# Patient Record
Sex: FEMALE | Race: WHITE | NOT HISPANIC OR LATINO | Employment: STUDENT | URBAN - METROPOLITAN AREA
[De-identification: names, ages, dates, MRNs, and addresses within clinical notes are randomized per-mention and may not be internally consistent; named-entity substitution may affect disease eponyms.]

---
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
## 2024-07-18 ENCOUNTER — TELEPHONE (OUTPATIENT)
Age: 19
End: 2024-07-18

## 2024-07-18 NOTE — TELEPHONE ENCOUNTER
"Behavioral Health Outpatient Intake Questions    Referred By   : Self    Please advise interviewee that they need to answer all questions truthfully to allow for best care, and any misrepresentations of information may affect their ability to be seen at this clinic   => Was this discussed? Yes     If Minor Child (under age 18)    Who is/are the legal guardian(s) of the child?     Is there a custody agreement? No     If \"YES\"- Custody orders must be obtained prior to scheduling the first appointment  In addition, Consent to Treatment must be signed by all legal guardians prior to scheduling the first appointment    If \"NO\"- Consent to Treatment must be signed by all legal guardians prior to scheduling the first appointment    Behavioral Health Outpatient Intake History -     Presenting Problem (in patient's own words): Anxiety, working situations through, struggles with relationships, adhd    Are there any communication barriers for this patient?     No                                               If yes, please describe barriers:     If there is a unique situation, please refer to Gallo Antoine/Tammie Mccarty for final determination.    Are you taking any psychiatric medications? Yes     If \"YES\" -What are they Zoloft, Concerta    If \"YES\" -Who prescribes? PCP    Has the Patient previously received outpatient Talk Therapy or Medication Management from [facility]  No        If \"YES\"- When, Where and with Whom?         If \"NO\" -Has Patient received these services elsewhere?       If \"YES\" -When, Where, and with Whom?    Has the Patient abused alcohol or other substances in the last 6 months ? No  No concerns of substance abuse are reported.     If \"YES\" -What substance, How much, How often?     If illegal substance: Refer to [facility] (for EB) or [facility].   If Alcohol in excess of 10 drinks per week:  Refer to [facility] (for EB) or [facility]    Legal History-     Is this treatment court " "ordered? No   If \"yes \"send to :  Talk Therapy : Send to Gallo Antoine/Tammie Mccarty for final determination   Med Management: Send to Dr Walker for final determination     Has the Patient been convicted of a felony?  No   If \"Yes\" send to -When, What?  Talk Therapy: Send to Gallo Arash/Tammie Mccarty for final determination   Med Management: Send to Dr Walker for final determination     ACCEPTED as a patient Yes  If \"Yes\" Appointment Date: NP 10/1 at 1 pm with Miri TURNER NP appt 10/22 at 12:30 with Danica Graham    Referred Elsewhere? No  If “Yes” - (Where? Ex: [facility], [facility], [facility], [facility], etc.)       Name of Insurance Co:[facility] [place] [facility] verified: [id number]   Insurance ID#[id number]   Insurance Phone #  If ins is primary or secondary?Primary  If patient is a minor, parents information such as Name, D.O.B of guarantor.  "

## 2024-07-18 NOTE — TELEPHONE ENCOUNTER
Contacted pt. off of Wait List in order to Schedule TT/MM, LVM to contact [phone] option 3 (Intake Team) in order to be scheduled with a Provider.    Promise verified:  [facility]   [phone]

## 2024-07-25 ENCOUNTER — TELEPHONE (OUTPATIENT)
Dept: PSYCHIATRY | Facility: CLINIC | Age: 19
End: 2024-07-25

## 2024-07-25 NOTE — TELEPHONE ENCOUNTER
One week follow up call for New Patient appointment with Miri Vernon on 10/01/2024 was made on 07/25/2024. Writer informed patient of New Patient paperwork needing to be completed 5 days prior to the appointment. Writer confirmed paperwork has been sent via Mail.    Appointment was made on: 07/18/2024

## 2024-08-19 ENCOUNTER — TELEPHONE (OUTPATIENT)
Dept: PSYCHIATRY | Facility: CLINIC | Age: 19
End: 2024-08-19

## 2024-08-19 NOTE — TELEPHONE ENCOUNTER
Writer called and spoke with mother about office need to c/x new patient therapy appt with ROBERTO Vernon.  Writer offered therapy appt with resident provider.  Patient and mother stated that with need to look at her school schedule to see which time would work for her the best.  Mother stated that she will call back when patient can get to her schedule. Transfer call to resident MR to make appt .

## 2024-08-19 NOTE — TELEPHONE ENCOUNTER
Patient mother called back and stated that patient could do Monday.  When making this appt mother stated that patient would like a female therapist.  Writer does not have any free female resident free at this time for therapy.

## 2024-08-20 NOTE — TELEPHONE ENCOUNTER
Called and left message for patient to return a call to [phone] regarding rescheduling their NP appt.

## 2024-08-26 ENCOUNTER — TELEPHONE (OUTPATIENT)
Dept: PSYCHIATRY | Facility: CLINIC | Age: 19
End: 2024-08-26

## 2024-08-26 NOTE — TELEPHONE ENCOUNTER
Called and left message for patient to inform 10/22/24 was cancelled due to provider being out of the office. Requested return call to reschedule. Please schedule upon return call. Thank you.

## 2024-09-03 ENCOUNTER — TELEPHONE (OUTPATIENT)
Dept: PSYCHIATRY | Facility: CLINIC | Age: 19
End: 2024-09-03

## 2024-09-03 NOTE — TELEPHONE ENCOUNTER
One week follow up call for New Patient appointment with   Danica Graham PA-C   on 10/09/2024 was made on 09/03/2024. Writer informed patient of New Patient paperwork needing to be completed 5 days prior to the appointment. Writer confirmed paperwork has been sent via Mail.    Appointment was made on: 08/26/2024

## 2024-10-01 ENCOUNTER — TELEPHONE (OUTPATIENT)
Dept: PSYCHIATRY | Facility: CLINIC | Age: 19
End: 2024-10-01

## 2024-10-01 NOTE — TELEPHONE ENCOUNTER
Called and left message for parent/guardian to inform 10/9/2024 was cancelled due to provider schedule change. Requested return call to reschedule. Please transfer to office upon return call to reschedule.     Office - Danica has approved for this patient to be scheduled in a time slot end of her day in order for patient to be seen sooner, like a 4-4:30pm. Just so it's the last appointment of the day.

## 2024-10-08 ENCOUNTER — TELEPHONE (OUTPATIENT)
Dept: PSYCHIATRY | Facility: CLINIC | Age: 19
End: 2024-10-08

## 2024-10-08 NOTE — TELEPHONE ENCOUNTER
One week follow up call for New Patient appointment with   Danica Graham PA-C   on 11/08/2024 was made on 10/08/2024. Writer informed patient of New Patient paperwork needing to be completed 5 days prior to the appointment. Writer confirmed paperwork has been sent via Mail.    Appointment was made on: 10/01/2024

## 2024-11-03 NOTE — PSYCH
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 (attention deficit hyperactivity disorder)     Anxiety     Depression      No past surgical history on file.  Allergies:    No Known Allergies  History Review:    The following portions of the patient's history were reviewed and updated as appropriate: allergies, current medications, past family history, past medical history, past social history, past surgical history, and problem list.    OBJECTIVE:      Mental Status Evaluation:    Appearance casually dressed, adequate grooming, good eye contact   Behavior pleasant, cooperative, calm, with anxious bearing, fidgeting   Speech normal volume, fluent, clear, coherent, with increased rate, sometimes hypertalkative and sometimes somewhat pressured   Mood depressed, anxious, with mention of sporadic irritability or euphoric moods with impulsivity   Affect normal range and intensity   Thought Processes organized, goal directed, somewhat concrete at times, can be obsessive, negative and self-deprecating, mistrustful of others-- but seems at least in part to stem from her impaired self-esteem/insecurities   Associations Somewhat concrete at times, otherwise intact   Thought Content no overt delusions   Perceptual " "Disturbances: Question of possible AH, but no definite hallucinations or paranoia and Pt does not appear to be responding to internal stimuli   Abnormal Thoughts  Risk Potential Suicidal ideation - None  Homicidal ideation - None  Potential for aggression - No   Orientation oriented to person, place, situation, day of week, date, month of year, and year   Memory short term memory grossly intact   Consciousness alert and awake   Attention Span attention span and concentration are age appropriate   Intellect appears to be of average intelligence   Insight partial   Judgement fair   Muscle Strength and  Gait normal gait and normal balance   Language no difficulty naming common objects, no difficulty repeating a phrase   Fund of Knowledge adequate knowledge of current events  adequate fund of knowledge regarding past history  adequate fund of knowledge regarding vocabulary    Pain none   Pain Scale N/A       Laboratory Results: I have personally reviewed all pertinent laboratory/tests results.  Most Recent Labs:   Lab Results   Component Value Date    SODIUM 137 05/21/2024    K 4.5 05/21/2024     05/21/2024    CO2 28 05/21/2024    BUN 9 05/21/2024    CREATININE 0.63 05/21/2024    GLUC 80 05/21/2024    CALCIUM 8.9 05/21/2024    AST 9 04/20/2022    ALT 18 04/20/2022    ALKPHOS 121 (H) 04/20/2022    TP 7.8 04/20/2022    ALB 3.2 (L) 04/20/2022    TBILI 0.2 04/20/2022    FREET4 1.27 09/22/2020     RPR: No results found for: \"RPR\"  Pregnancy: No results found for: \"PREGUR\", \"PREGSERUM\", \"HCG\", \"HCGQUANT\"  Drug Screen: No results found for: \"AMPMETHUR\", \"BARBTUR\", \"BDZUR\", \"THCUR\", \"COCAINEUR\", \"METHADONEUR\", \"OPIATEUR\", \"PCPUR\", \"ECSTASYUR\"  Medical alcohol level No results found for: \"ETOH\"  EKG No results found for: \"VENTRATE\", \"ATRIALRATE\", \"PRINT\", \"QRSDINT\", \"QTINT\", \"QTCINT\", \"PAXIS\", \"QRSAXIS\", \"TWAVEAXIS\"  Imaging Studies: No results found.      Assessment/Plan:     Diagnoses and all orders for this " visit:    Generalized anxiety disorder  -     CBC and differential; Future  -     Hepatic function panel; Future  -     Pregnancy Test (HCG Qualitative); Future  -     ECG 12 lead; Future    Social anxiety disorder  -     CBC and differential; Future  -     Hepatic function panel; Future  -     Pregnancy Test (HCG Qualitative); Future  -     ECG 12 lead; Future    Mood disorder (HCC)  -     CBC and differential; Future  -     Hepatic function panel; Future  -     Pregnancy Test (HCG Qualitative); Future  -     lamoTRIgine (LaMICtal) 25 mg tablet; Take 1 tablet (25 mg total) by mouth daily at bedtime for 14 days Start with this dose first  -     lamoTRIgine (LaMICtal) 100 mg tablet; Take 0.5 tablets (50 mg total) by mouth daily at bedtime Start this after the 25mg tabs are finished (on 11/23/2024)  -     ECG 12 lead; Future    Adult ADHD  -     methylphenidate (CONCERTA) 27 MG ER tablet; Take 1 tablet (27 mg total) by mouth daily Max Daily Amount: 27 mg  -     methylphenidate (CONCERTA) 27 MG ER tablet; Take 1 tablet (27 mg total) by mouth daily For ongoing therapy Max Daily Amount: 27 mg  -     ECG 12 lead; Future          Plan:  Pt is having depressive, impulsive, anxious (general and socially related) Sxs, though at the same time, verbalizes that she does not care about having interpersonal relationships. She has some ASD traits (with h/o borderline for ASD Dx per past testing), some OCD traits, some questionable AH but does not give h/o psychosis nor does she demonstrate such at this time.  She does have mistrust of others but this could stem from prior bullying and also ASD disconnect.  No h/o trauma or PTSD Sxs.  She gets panic attacks and also has h/o ADHD.  Working Dxs are as listed above.  She feels her panic and ADHD are under control with her current medicines.  Her PCP has been bridging the gap to this appt and had continued the Sertraline and Methylphenidate ER.  The SSRI was recently increased and was  helping for a while, but not so much anymore.  Tx options discussed and Pt accepts to start Lamotrigine for mood mgt and to continue her other medicines unchanged at this time.  Will tease out mood and personality factors as well as ASD factors a bit further.  I would recommend repeat neuropsychological testing to re-evaluate for ASD versus mood or personality factors. Treatment plan not done today due to extensive time spent gathering Hx and discussing the assessment and plan.  Pt accepts today's plan.   Start Lamotrigine 25mg (1) tab po qhs x 2 weeks # 14, then 100mg (1/2) tab po qhs # 90  Continue:   Sertraline 50mg + 25mg (1) tab po qd -- PCP gave a Rx for Qty 90 each 10/13/2024  Methylphenidate 27mg (1) tab # 28 + 28  last filled 10/10/2024 per PDMP  Pt to have her psychotherapy intake appt with Gracy Stearns LCSW on 12/27/2024  Get CBC with diff, LFTs, Serum Beta HCG, and EKG  Return Tues 12/10/2024 at 6:30PM, and Thurs 1/9/2025 at 4:30PM.  Can call any time sooner prn.  Pt made aware of the 24-7 on call service line.    Risks/Benefits/Precautions:      Risks, Benefits And Possible Side Effects Of Medications:    Risks, benefits, and possible side effects of medications explained to Juliana and she verbalizes understanding and agreement for treatment.  Risks of medications in pregnancy explained to Juliana. She verbalizes understanding and agrees to notify her doctor if she becomes pregnant.    Controlled Medication Discussion:     Juliana has been filling controlled prescriptions on time as prescribed according to [place] Prescription Drug Monitoring Program.   Discussed the SE and risk of addiction to stimulants.       Danica Graham PA-C    Visit Time    Visit Start Time: 4:25PM  Visit Stop Time: 7:35PM  Total Visit Duration:  As above minutes

## 2024-11-08 ENCOUNTER — OFFICE VISIT (OUTPATIENT)
Dept: PSYCHIATRY | Facility: CLINIC | Age: 19
End: 2024-11-08
Payer: COMMERCIAL

## 2024-11-08 VITALS — WEIGHT: 198 LBS | HEIGHT: 63 IN | BODY MASS INDEX: 35.08 KG/M2

## 2024-11-08 DIAGNOSIS — F41.1 GENERALIZED ANXIETY DISORDER: Primary | ICD-10-CM

## 2024-11-08 DIAGNOSIS — F39 MOOD DISORDER (HCC): ICD-10-CM

## 2024-11-08 DIAGNOSIS — F90.9 ADULT ADHD: ICD-10-CM

## 2024-11-08 DIAGNOSIS — F40.10 SOCIAL ANXIETY DISORDER: ICD-10-CM

## 2024-11-08 PROCEDURE — 90792 PSYCH DIAG EVAL W/MED SRVCS: CPT | Performed by: PHYSICIAN ASSISTANT

## 2024-11-08 RX ORDER — METHYLPHENIDATE HYDROCHLORIDE 27 MG/1
27 TABLET ORAL DAILY
Qty: 28 TABLET | Refills: 0 | Status: SHIPPED | OUTPATIENT
Start: 2024-11-08

## 2024-11-08 RX ORDER — LAMOTRIGINE 25 MG/1
25 TABLET ORAL
Qty: 14 TABLET | Refills: 0 | Status: SHIPPED | OUTPATIENT
Start: 2024-11-08 | End: 2024-11-22

## 2024-11-08 RX ORDER — LAMOTRIGINE 100 MG/1
50 TABLET ORAL
Qty: 90 TABLET | Refills: 0 | Status: SHIPPED | OUTPATIENT
Start: 2024-11-08

## 2024-11-09 NOTE — ASSESSMENT & PLAN NOTE
Orders:    CBC and differential; Future    Hepatic function panel; Future    Pregnancy Test (HCG Qualitative); Future    lamoTRIgine (LaMICtal) 25 mg tablet; Take 1 tablet (25 mg total) by mouth daily at bedtime for 14 days Start with this dose first    lamoTRIgine (LaMICtal) 100 mg tablet; Take 0.5 tablets (50 mg total) by mouth daily at bedtime Start this after the 25mg tabs are finished (on 11/23/2024)    ECG 12 lead; Future

## 2024-11-09 NOTE — ASSESSMENT & PLAN NOTE
Orders:    CBC and differential; Future    Hepatic function panel; Future    Pregnancy Test (HCG Qualitative); Future    ECG 12 lead; Future

## 2024-11-12 ENCOUNTER — TELEPHONE (OUTPATIENT)
Age: 19
End: 2024-11-12

## 2024-11-12 NOTE — TELEPHONE ENCOUNTER
Staice called in to verify Juliana's appointments with Danica Graham and Gracy Stearns. Writer verified dates and times with Stacie.

## 2024-11-27 ENCOUNTER — NURSE TRIAGE (OUTPATIENT)
Age: 19
End: 2024-11-27

## 2024-11-27 NOTE — TELEPHONE ENCOUNTER
"Per patient she has had 1 day of brown vaginal discharge. Patient states it smells like blood. Patient LMP was 1 month ago. Patient is on birth control pills but missed 3 days of pills due to recent illness. Patient states she has slight cramping. No fever, no vaginal symptoms. Patient states she is half way thru second week of pill pack currently. Advised to continue pills and that breakthru bleeding is from missed pills. Advised cramping is also most likely due to change in hormone level as well and she may have irregular bleeding until placebo week and then new pill pack starts. Patient advised to use back up method, patient states she is currently not active with a partner. Advised to call back if any other concerns.   Reason for Disposition   Taking birth control pills and has missed one or more pills; or took a progestin-only pill late    Answer Assessment - Initial Assessment Questions  1. BLEEDING SEVERITY: \"Describe the bleeding that you are having.\" \"How much bleeding is there?\"       Irregular brown spotting , smells like blood    2. ONSET: \"When did the bleeding begin?\" \"Is it continuing now?\"      Today   3. MENSTRUAL PERIOD: \"When was the last normal menstrual period?\" \"How is this different than your period?\"      Last month   4. REGULARITY: \"How regular are your periods?\"      On hormonal BCP   5. ABDOMEN PAIN: \"Do you have any pain?\" \"How bad is the pain?\"  (e.g., Scale 0-10; none, mild, moderate, or severe)      Mild cramping   6. PREGNANCY: \"Is there any chance you are pregnant?\" \"When was your last menstrual period?\"      Denies   7. BREASTFEEDING: \"Are you breastfeeding?\"      denies  8. HORMONE MEDICINES: \"Are you taking any hormone medicines, prescription or over-the-counter?\" (e.g., birth control pills, estrogen)      BCP   9. BLOOD THINNER MEDICINES: \"Do you take any blood thinners?\" (e.g., Coumadin / warfarin, Pradaxa / dabigatran, aspirin)      denies  10. CAUSE: \"What do you think is " "causing the bleeding?\" (e.g., recent gyn surgery, recent gyn procedure; known bleeding disorder, cervical cancer, polycystic ovarian disease, fibroids)          Several days of Missed pills   11. HEMODYNAMIC STATUS: \"Are you weak or feeling lightheaded?\" If Yes, ask: \"Can you stand and walk normally?\"         denies  12. OTHER SYMPTOMS: \"What other symptoms are you having with the bleeding?\" (e.g., passed tissue, vaginal discharge, fever, menstrual-type cramps)        denies    Protocols used: Vaginal Bleeding - Abnormal-Adult-[place]    "

## 2024-11-28 DIAGNOSIS — N94.6 DYSMENORRHEA: ICD-10-CM

## 2024-11-28 DIAGNOSIS — R79.89 ELEVATED TESTOSTERONE LEVEL IN FEMALE: ICD-10-CM

## 2024-11-29 RX ORDER — NORGESTIMATE AND ETHINYL ESTRADIOL 7DAYSX3 28
1 KIT ORAL DAILY
Qty: 84 TABLET | Refills: 0 | Status: SHIPPED | OUTPATIENT
Start: 2024-11-29

## 2024-11-30 ENCOUNTER — APPOINTMENT (OUTPATIENT)
Dept: RADIOLOGY | Facility: MEDICAL CENTER | Age: 19
End: 2024-11-30
Payer: COMMERCIAL

## 2024-11-30 ENCOUNTER — OFFICE VISIT (OUTPATIENT)
Dept: URGENT CARE | Facility: MEDICAL CENTER | Age: 19
End: 2024-11-30
Payer: COMMERCIAL

## 2024-11-30 VITALS
RESPIRATION RATE: 22 BRPM | TEMPERATURE: 98.2 F | DIASTOLIC BLOOD PRESSURE: 62 MMHG | SYSTOLIC BLOOD PRESSURE: 120 MMHG | HEART RATE: 101 BPM | OXYGEN SATURATION: 95 %

## 2024-11-30 DIAGNOSIS — R06.02 SHORTNESS OF BREATH: ICD-10-CM

## 2024-11-30 DIAGNOSIS — Z75.8 DOES NOT HAVE PRIMARY CARE PROVIDER: ICD-10-CM

## 2024-11-30 DIAGNOSIS — J18.9 PNEUMONIA OF LEFT LOWER LOBE DUE TO INFECTIOUS ORGANISM: Primary | ICD-10-CM

## 2024-11-30 DIAGNOSIS — Z20.89 EXPOSURE TO PNEUMONIA: ICD-10-CM

## 2024-11-30 PROCEDURE — 99214 OFFICE O/P EST MOD 30 MIN: CPT

## 2024-11-30 PROCEDURE — 71046 X-RAY EXAM CHEST 2 VIEWS: CPT

## 2024-11-30 PROCEDURE — S9088 SERVICES PROVIDED IN URGENT: HCPCS

## 2024-11-30 RX ORDER — AMOXICILLIN 500 MG/1
1000 CAPSULE ORAL EVERY 8 HOURS SCHEDULED
Qty: 30 CAPSULE | Refills: 0 | Status: SHIPPED | OUTPATIENT
Start: 2024-11-30 | End: 2024-12-05

## 2024-11-30 RX ORDER — ALBUTEROL SULFATE 90 UG/1
2 INHALANT RESPIRATORY (INHALATION) EVERY 4 HOURS PRN
Qty: 8.5 G | Refills: 0 | Status: SHIPPED | OUTPATIENT
Start: 2024-11-30

## 2024-11-30 RX ORDER — HYDROCODONE BITARTRATE AND ACETAMINOPHEN 5; 325 MG/1; MG/1
1 TABLET ORAL EVERY 6 HOURS PRN
Refills: 0 | Status: CANCELLED | OUTPATIENT
Start: 2024-11-30

## 2024-11-30 RX ORDER — GUAIFENESIN 600 MG/1
1200 TABLET, EXTENDED RELEASE ORAL EVERY 12 HOURS SCHEDULED
Qty: 28 TABLET | Refills: 0 | Status: SHIPPED | OUTPATIENT
Start: 2024-11-30 | End: 2024-12-07

## 2024-11-30 RX ORDER — AZITHROMYCIN 500 MG/1
500 TABLET, FILM COATED ORAL DAILY
Qty: 5 TABLET | Refills: 0 | Status: SHIPPED | OUTPATIENT
Start: 2024-11-30 | End: 2024-12-05
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
## 2024-11-30 NOTE — PATIENT INSTRUCTIONS
XR chest performed which demonstrated a opacity to the left lower lobe which most likely represents a developing pneumonia.     Take Mucinex during the day as prescribed    Take antibiotics as prescribed.   Take entire course of antibiotics.     Eat yogurt with live and active cultures and/or take a probiotic at least 3 hours before or after antibiotic dose.   Monitor stool for diarrhea and/or blood. If this occurs, contact primary care doctor ASAP.     Albuterol inhaler as needed for shortness of breath or wheezing    Recommend the following options: room humidifier, vicks vapo rub, hot/steamy shower.  Offer fluids frequently to help with hydration, as staying hydrated helps loosen up thick mucous.   May drink warm water with honey. May use lemon.  Tylenol/Ibuprofen for pain/fever.  Encourage coughing into the elbow instead of the hand.   Washing hands frequently with warm water and soap may help stop spread of infection.    If symptoms worsen, please proceed to the ER for further evaluation.    Follow up with your PCP in 4-6 weeks for a repeat XR for resolution of pneumonia    Establish care and follow up with PCP in 3-5 days.  Proceed to ER if symptoms worsen.    If tests are performed, our office will contact you with results only if changes need to made to the care plan discussed with you at the visit. You can review your full results on [facility] Mychart.

## 2024-11-30 NOTE — PROGRESS NOTES
[facility]        NAME: Juliana Valdes is a 19 y.o. female  : 2005    MRN: [id number]  DATE: December 3, 2024  TIME: 8:09 AM    Assessment and Plan   Pneumonia of left lower lobe due to infectious organism [J18.9]  1. Pneumonia of left lower lobe due to infectious organism  amoxicillin (AMOXIL) 500 mg capsule    guaiFENesin (MUCINEX) 600 mg 12 hr tablet    albuterol (ProAir HFA) 90 mcg/act inhaler    azithromycin (ZITHROMAX) 500 MG tablet    Ambulatory Referral to Family Practice      2. Shortness of breath  XR chest pa and lateral    albuterol (ProAir HFA) 90 mcg/act inhaler    Ambulatory Referral to Family Practice      3. Exposure to pneumonia        4. Does not have primary care provider  Ambulatory Referral to Family Practice            Patient Instructions     XR chest performed which demonstrated a opacity to the left lower lobe which most likely represents a developing pneumonia.     Take Mucinex during the day as prescribed    Take antibiotics as prescribed.   Take entire course of antibiotics.     Eat yogurt with live and active cultures and/or take a probiotic at least 3 hours before or after antibiotic dose.   Monitor stool for diarrhea and/or blood. If this occurs, contact primary care doctor ASAP.     Albuterol inhaler as needed for shortness of breath or wheezing    Recommend the following options: room humidifier, vicks vapo rub, hot/steamy shower.  Offer fluids frequently to help with hydration, as staying hydrated helps loosen up thick mucous.   May drink warm water with honey. May use lemon.  Tylenol/Ibuprofen for pain/fever.  Encourage coughing into the elbow instead of the hand.   Washing hands frequently with warm water and soap may help stop spread of infection.    If symptoms worsen, please proceed to the ER for further evaluation.    Follow up with your PCP in 4-6 weeks for a repeat XR for resolution of pneumonia    Establish care and follow up with PCP in 3-5 days.  Proceed  to ER if symptoms worsen.    If tests are performed, our office will contact you with results only if changes need to made to the care plan discussed with you at the visit. You can review your full results on [facility].    Chief Complaint     Chief Complaint   Patient presents with    Shortness of Breath    Cough     Cough, chest congestion, wheezing, worse at night and while laying flat, unable to fully exhale; symptoms not getting better and ongoing for over a week         Fever     Fever that initially started 3-4 days ago     Fatigue    Wheezing     History of Present Illness   Pt is a 20 y/o F who presents to the clinic accompanied by her Mother.     Pt reports that she has a positive exposure to a classmate she sits next to in one of her college classes.     Cough  This is a new problem. The current episode started 1 to 4 weeks ago (Over 1 week). The problem has been gradually worsening. The problem occurs constantly. The cough is Non-productive (Moist, harsh). Associated symptoms include chills, a fever, headaches, myalgias, a sore throat, shortness of breath and wheezing. Pertinent negatives include no chest pain, ear pain, postnasal drip, rash or rhinorrhea. Treatments tried: Tylenol, Motrin. Her past medical history is significant for environmental allergies. There is no history of asthma, bronchitis or pneumonia.       Review of Systems   Review of Systems   Constitutional:  Positive for activity change, appetite change, chills, diaphoresis, fatigue and fever.   HENT:  Positive for congestion (chest) and sore throat. Negative for ear discharge, ear pain, postnasal drip, rhinorrhea, sinus pressure and sinus pain.    Respiratory:  Positive for cough, chest tightness, shortness of breath and wheezing.         Cough, chest congestion and wheezing worse at night and while lying flat. Feels like she is unable to fully exhale.    Cardiovascular: Negative.  Negative for chest pain and palpitations.    Gastrointestinal:  Negative for abdominal pain, constipation, diarrhea, nausea and vomiting.   Musculoskeletal:  Positive for myalgias.   Skin:  Negative for color change, rash and wound.   Allergic/Immunologic: Positive for environmental allergies.   Neurological:  Positive for headaches.     Current Medications       Current Outpatient Medications:     albuterol (ProAir HFA) 90 mcg/act inhaler, Inhale 2 puffs every 4 (four) hours as needed for wheezing or shortness of breath, Disp: 8.5 g, Rfl: 0    amoxicillin (AMOXIL) 500 mg capsule, Take 2 capsules (1,000 mg total) by mouth every 8 (eight) hours for 5 days, Disp: 30 capsule, Rfl: 0    azithromycin (ZITHROMAX) 500 MG tablet, Take 1 tablet (500 mg total) by mouth daily for 5 days, Disp: 5 tablet, Rfl: 0    guaiFENesin (MUCINEX) 600 mg 12 hr tablet, Take 2 tablets (1,200 mg total) by mouth every 12 (twelve) hours for 7 days, Disp: 28 tablet, Rfl: 0    methylphenidate (CONCERTA) 27 MG ER tablet, Take 1 tablet (27 mg total) by mouth daily For ongoing therapy Max Daily Amount: 27 mg, Disp: 28 tablet, Rfl: 0    norgestimate-ethinyl estradiol (ORTHO TRI-CYCLEN,TRINESSA) 0.18/0.215/0.25 MG-35 MCG per tablet, TAKE 1 TABLET BY MOUTH EVERY DAY, Disp: 84 tablet, Rfl: 0    sertraline (ZOLOFT) 25 mg tablet, Take 25 mg by mouth daily, Disp: , Rfl:     sertraline (ZOLOFT) 50 mg tablet, Take 50 mg by mouth daily, Disp: , Rfl:     lamoTRIgine (LaMICtal) 100 mg tablet, Take 0.5 tablets (50 mg total) by mouth daily at bedtime Start this after the 25mg tabs are finished (on 11/23/2024) (Patient not taking: Reported on 11/30/2024), Disp: 90 tablet, Rfl: 0    lamoTRIgine (LaMICtal) 25 mg tablet, Take 1 tablet (25 mg total) by mouth daily at bedtime for 14 days Start with this dose first (Patient not taking: Reported on 11/30/2024), Disp: 14 tablet, Rfl: 0    levocetirizine (XYZAL) 5 MG tablet, Take 5 mg by mouth daily, Disp: , Rfl:     methylphenidate (CONCERTA) 27 MG ER tablet, Take  1 tablet (27 mg total) by mouth daily Max Daily Amount: 27 mg, Disp: 28 tablet, Rfl: 0    Current Allergies     Allergies as of 11/30/2024 - Reviewed 11/30/2024   Allergen Reaction Noted    Cat hair extract Other (See Comments) 04/20/2022            The following portions of the patient's history were reviewed and updated as appropriate: allergies, current medications, past family history, past medical history, past social history, past surgical history and problem list.     Past Medical History:   Diagnosis Date    ADHD (attention deficit hyperactivity disorder)     Anxiety     Depression        History reviewed. No pertinent surgical history.    Family History   Problem Relation Age of Onset    No Known Problems Mother     No Known Problems Sister     No Known Problems Brother     Frontotemporal dementia Maternal Grandmother     Breast cancer Paternal Grandmother     Alcohol abuse Maternal Uncle     Alcohol abuse Maternal Uncle     Colon cancer Neg Hx     Ovarian cancer Neg Hx     Uterine cancer Neg Hx     Cervical cancer Neg Hx          Medications have been verified.        Objective   /62   Pulse 101   Temp 98.2 °F (36.8 °C) (Temporal)   Resp 22   LMP 10/11/2024 (Approximate)   SpO2 95%        Physical Exam     Physical Exam  Vitals and nursing note reviewed.   Constitutional:       General: She is not in acute distress.     Appearance: She is ill-appearing. She is not toxic-appearing or diaphoretic.   HENT:      Head: Normocephalic.      Right Ear: Tympanic membrane, ear canal and external ear normal. There is no impacted cerumen.      Left Ear: Tympanic membrane, ear canal and external ear normal. There is no impacted cerumen.      Nose: Nose normal. No congestion or rhinorrhea.      Mouth/Throat:      Mouth: Mucous membranes are moist.      Pharynx: Posterior oropharyngeal erythema (minimal) present.   Cardiovascular:      Rate and Rhythm: Regular rhythm. Tachycardia present.      Pulses: Normal  pulses.      Heart sounds: Normal heart sounds. No murmur heard.  Pulmonary:      Effort: Tachypnea present.      Breath sounds: Examination of the left-middle field reveals rales. Examination of the left-lower field reveals rales. Rales present. No wheezing.      Comments: Strong, moist, harsh cough noted during examination  Musculoskeletal:         General: Normal range of motion.   Lymphadenopathy:      Cervical: Cervical adenopathy present.      Right cervical: Superficial cervical adenopathy present.      Left cervical: Superficial cervical adenopathy present.   Skin:     General: Skin is warm.   Neurological:      Mental Status: She is alert.

## 2024-11-30 NOTE — LETTER
November 30, 2024     Patient: Juliana Valdes   YOB: 2005   Date of Visit: 11/30/2024       To Whom it May Concern:    Juliana Valdes was seen in my clinic on 11/30/2024.     She may return to school on 12/9/2024 as long as she is fever free for 24 hours without the use of fever reducing agents and symptoms are resolving .    If allowed, please substitute Juliana's school work and allow online work as tolerated.      If you have any questions or concerns, please don't hesitate to call.         Sincerely,          MELI Jules        CC: No Recipients

## 2024-12-02 ENCOUNTER — TELEPHONE (OUTPATIENT)
Age: 19
End: 2024-12-02

## 2024-12-02 NOTE — TELEPHONE ENCOUNTER
Patient's mother called the PCP Referral line to schedule for her to be established as a new patient, she was also seen in the Urgent Care and needed to be seen this week.  There are no available appointments in your office until mid-January and this location is her preference.  She is scheduled at this time for 1/16/25.  If there is anything the office can do sooner to see her, please give her a call back, as it is an urgent care followup.    Thank you!

## 2024-12-06 ENCOUNTER — OFFICE VISIT (OUTPATIENT)
Dept: URGENT CARE | Facility: MEDICAL CENTER | Age: 19
End: 2024-12-06
Payer: COMMERCIAL

## 2024-12-06 ENCOUNTER — APPOINTMENT (OUTPATIENT)
Dept: RADIOLOGY | Facility: MEDICAL CENTER | Age: 19
End: 2024-12-06
Payer: COMMERCIAL

## 2024-12-06 VITALS
SYSTOLIC BLOOD PRESSURE: 121 MMHG | HEART RATE: 110 BPM | RESPIRATION RATE: 18 BRPM | DIASTOLIC BLOOD PRESSURE: 56 MMHG | TEMPERATURE: 98.6 F | OXYGEN SATURATION: 98 %

## 2024-12-06 DIAGNOSIS — J18.9 PNEUMONIA OF LEFT LOWER LOBE DUE TO INFECTIOUS ORGANISM: ICD-10-CM

## 2024-12-06 DIAGNOSIS — R00.0 TACHYCARDIA WITH HEART RATE 100-120 BEATS PER MINUTE: ICD-10-CM

## 2024-12-06 DIAGNOSIS — R05.1 ACUTE COUGH: Primary | ICD-10-CM

## 2024-12-06 PROCEDURE — S9088 SERVICES PROVIDED IN URGENT: HCPCS

## 2024-12-06 PROCEDURE — 71046 X-RAY EXAM CHEST 2 VIEWS: CPT

## 2024-12-06 PROCEDURE — 99214 OFFICE O/P EST MOD 30 MIN: CPT

## 2024-12-06 RX ORDER — PREDNISONE 10 MG/1
TABLET ORAL
Qty: 30 TABLET | Refills: 0 | Status: SHIPPED | OUTPATIENT
Start: 2024-12-06 | End: 2024-12-18

## 2024-12-06 NOTE — PATIENT INSTRUCTIONS
Take steroids as prescribed.   Recommend to take them in the morning and with food  Do not take Ibuprofen while on steroids.   May take Acetaminophen for pain.  Discussed that steroids may elevated blood glucose levels and that pt should monitor blood sugars closely.     Albuterol inhaler as previously prescribed  OTC Mucinex (blue box) during the day    Recommend the following options: room humidifier, vicks vapo rub, hot/steamy shower.  Offer fluids frequently to help with hydration, as staying hydrated helps loosen up thick mucous.   May drink warm water with honey. May use lemon.  Tylenol/Ibuprofen for pain/fever.  Encourage coughing into the elbow instead of the hand.   Washing hands frequently with warm water and soap may help stop spread of infection.    If symptoms do not improve please follow with PCP   Keep scheduled appointment in January  Proceed to ER if symptoms worsen.    If tests are performed, our office will contact you with results only if changes need to made to the care plan discussed with you at the visit. You can review your full results on [facility].

## 2024-12-06 NOTE — PROGRESS NOTES
[facility]        NAME: Juliana Valdes is a 19 y.o. female  : 2005    MRN: [id number]  DATE: 2024  TIME: 3:19 PM    Assessment and Plan   Acute cough [R05.1]  1. Acute cough  predniSONE 10 mg tablet      2. Tachycardia with heart rate 100-120 beats per minute  XR chest pa and lateral      3. Pneumonia of left lower lobe due to infectious organism  XR chest pa and lateral        XR chest pa and lateral: Significant improvement to LLL pneumonia, per my read, no acute findings, pending radiology final read    Patient Instructions   Take steroids as prescribed.   Recommend to take them in the morning and with food  Do not take Ibuprofen while on steroids.   May take Acetaminophen for pain.  Discussed that steroids may elevated blood glucose levels and that pt should monitor blood sugars closely.     Albuterol inhaler as previously prescribed  OTC Mucinex (blue box) during the day    Recommend the following options: room humidifier, vicks vapo rub, hot/steamy shower.  Offer fluids frequently to help with hydration, as staying hydrated helps loosen up thick mucous.   May drink warm water with honey. May use lemon.  Tylenol/Ibuprofen for pain/fever.  Encourage coughing into the elbow instead of the hand.   Washing hands frequently with warm water and soap may help stop spread of infection.    If symptoms do not improve please follow with PCP   Keep scheduled appointment in January  Proceed to ER if symptoms worsen.    If tests are performed, our office will contact you with results only if changes need to made to the care plan discussed with you at the visit. You can review your full results on [facility].  Chief Complaint     Chief Complaint   Patient presents with   • Follow-up     Patient states she is here for follow up for pneumonia; still has a slight cough and crackles but does have improvement; was treated with azithromycin and amoxicillin      History of Present Illness        HPI    Review of Systems   Review of Systems      Current Medications       Current Outpatient Medications:   •  albuterol (ProAir HFA) 90 mcg/act inhaler, Inhale 2 puffs every 4 (four) hours as needed for wheezing or shortness of breath, Disp: 8.5 g, Rfl: 0  •  guaiFENesin (MUCINEX) 600 mg 12 hr tablet, Take 2 tablets (1,200 mg total) by mouth every 12 (twelve) hours for 7 days, Disp: 28 tablet, Rfl: 0  •  methylphenidate (CONCERTA) 27 MG ER tablet, Take 1 tablet (27 mg total) by mouth daily Max Daily Amount: 27 mg, Disp: 28 tablet, Rfl: 0  •  norgestimate-ethinyl estradiol (ORTHO TRI-CYCLEN,TRINESSA) 0.18/0.215/0.25 MG-35 MCG per tablet, TAKE 1 TABLET BY MOUTH EVERY DAY, Disp: 84 tablet, Rfl: 0  •  predniSONE 10 mg tablet, Take 4 tablets (40 mg total) by mouth daily for 3 days, THEN 3 tablets (30 mg total) daily for 3 days, THEN 2 tablets (20 mg total) daily for 3 days, THEN 1 tablet (10 mg total) daily for 3 days., Disp: 30 tablet, Rfl: 0  •  sertraline (ZOLOFT) 25 mg tablet, Take 25 mg by mouth daily, Disp: , Rfl:   •  sertraline (ZOLOFT) 50 mg tablet, Take 50 mg by mouth daily, Disp: , Rfl:   •  lamoTRIgine (LaMICtal) 100 mg tablet, Take 0.5 tablets (50 mg total) by mouth daily at bedtime Start this after the 25mg tabs are finished (on 11/23/2024) (Patient not taking: Reported on 11/30/2024), Disp: 90 tablet, Rfl: 0  •  lamoTRIgine (LaMICtal) 25 mg tablet, Take 1 tablet (25 mg total) by mouth daily at bedtime for 14 days Start with this dose first (Patient not taking: Reported on 11/30/2024), Disp: 14 tablet, Rfl: 0  •  levocetirizine (XYZAL) 5 MG tablet, Take 5 mg by mouth daily, Disp: , Rfl:   •  methylphenidate (CONCERTA) 27 MG ER tablet, Take 1 tablet (27 mg total) by mouth daily For ongoing therapy Max Daily Amount: 27 mg, Disp: 28 tablet, Rfl: 0    Current Allergies     Allergies as of 12/06/2024 - Reviewed 12/06/2024   Allergen Reaction Noted   • Cat hair extract Other (See Comments) 04/20/2022             The following portions of the patient's history were reviewed and updated as appropriate: allergies, current medications, past family history, past medical history, past social history, past surgical history and problem list.     Past Medical History:   Diagnosis Date   • ADHD (attention deficit hyperactivity disorder)    • Anxiety    • Depression        History reviewed. No pertinent surgical history.    Family History   Problem Relation Age of Onset   • No Known Problems Mother    • No Known Problems Sister    • No Known Problems Brother    • Frontotemporal dementia Maternal Grandmother    • Breast cancer Paternal Grandmother    • Alcohol abuse Maternal Uncle    • Alcohol abuse Maternal Uncle    • Colon cancer Neg Hx    • Ovarian cancer Neg Hx    • Uterine cancer Neg Hx    • Cervical cancer Neg Hx          Medications have been verified.        Objective   /56   Pulse (!) 110   Temp 98.6 °F (37 °C) (Temporal)   Resp 18   LMP 10/11/2024 (Approximate)   SpO2 98%        Physical Exam     Physical Exam               Alcohol abuse Maternal Uncle     Alcohol abuse Maternal Uncle     Colon cancer Neg Hx     Ovarian cancer Neg Hx     Uterine cancer Neg Hx     Cervical cancer Neg Hx          Medications have been verified.        Objective   /56   Pulse (!) 110   Temp 98.6 °F (37 °C) (Temporal)   Resp 18   LMP 10/11/2024 (Approximate)   SpO2 98%        Physical Exam     Physical Exam  Vitals and nursing note reviewed.   Constitutional:       General: She is not in acute distress.     Appearance: Normal appearance. She is not ill-appearing, toxic-appearing or diaphoretic.   HENT:      Head: Normocephalic.      Right Ear: Tympanic membrane, ear canal and external ear normal. There is no impacted cerumen.      Left Ear: Tympanic membrane, ear canal and external ear normal. There is no impacted cerumen.      Nose: Nose normal. No congestion or rhinorrhea.      Mouth/Throat:      Pharynx: No posterior oropharyngeal erythema.   Cardiovascular:      Rate and Rhythm: Regular rhythm. Tachycardia present.      Pulses: Normal pulses.      Heart sounds: Normal heart sounds. No murmur heard.  Pulmonary:      Effort: Pulmonary effort is normal. No respiratory distress.      Breath sounds: No stridor. Examination of the left-middle field reveals rales. Examination of the left-lower field reveals rales. Rales present. No wheezing or rhonchi.      Comments: Non productive cough during examination  Chest:      Chest wall: No tenderness.   Musculoskeletal:         General: Normal range of motion.   Lymphadenopathy:      Cervical: No cervical adenopathy.   Skin:     General: Skin is warm.   Neurological:      Mental Status: She is alert.   Psychiatric:         Mood and Affect: Mood is anxious.

## 2024-12-07 ENCOUNTER — APPOINTMENT (OUTPATIENT)
Dept: LAB | Facility: HOSPITAL | Age: 19
End: 2024-12-07
Payer: COMMERCIAL

## 2024-12-07 ENCOUNTER — OFFICE VISIT (OUTPATIENT)
Dept: LAB | Facility: HOSPITAL | Age: 19
End: 2024-12-07
Payer: COMMERCIAL

## 2024-12-07 DIAGNOSIS — F39 MOOD DISORDER (HCC): ICD-10-CM

## 2024-12-07 DIAGNOSIS — F40.10 SOCIAL ANXIETY DISORDER: ICD-10-CM

## 2024-12-07 DIAGNOSIS — F41.1 GENERALIZED ANXIETY DISORDER: ICD-10-CM

## 2024-12-07 DIAGNOSIS — F90.9 ADULT ADHD: ICD-10-CM

## 2024-12-07 LAB
ALBUMIN SERPL BCG-MCNC: 3.7 G/DL (ref 3.5–5)
ALP SERPL-CCNC: 99 U/L (ref 34–104)
ALT SERPL W P-5'-P-CCNC: 10 U/L (ref 7–52)
AST SERPL W P-5'-P-CCNC: 10 U/L (ref 13–39)
ATRIAL RATE: 0 BPM
BASOPHILS # BLD AUTO: 0.04 THOUSANDS/ÂΜL (ref 0–0.1)
BASOPHILS NFR BLD AUTO: 0 % (ref 0–1)
BILIRUB DIRECT SERPL-MCNC: 0.05 MG/DL (ref 0–0.2)
BILIRUB SERPL-MCNC: 0.43 MG/DL (ref 0.2–1)
EOSINOPHIL # BLD AUTO: 0.07 THOUSAND/ÂΜL (ref 0–0.61)
EOSINOPHIL NFR BLD AUTO: 1 % (ref 0–6)
ERYTHROCYTE [DISTWIDTH] IN BLOOD BY AUTOMATED COUNT: 13.2 % (ref 11.6–15.1)
HCG SERPL QL: NEGATIVE
HCT VFR BLD AUTO: 33.2 % (ref 34.8–46.1)
HGB BLD-MCNC: 12.8 G/DL (ref 11.5–15.4)
IMM GRANULOCYTES # BLD AUTO: 0.09 THOUSAND/UL (ref 0–0.2)
IMM GRANULOCYTES NFR BLD AUTO: 1 % (ref 0–2)
LYMPHOCYTES # BLD AUTO: 1.44 THOUSANDS/ÂΜL (ref 0.6–4.47)
LYMPHOCYTES NFR BLD AUTO: 12 % (ref 14–44)
MCH RBC QN AUTO: 35.7 PG (ref 26.8–34.3)
MCHC RBC AUTO-ENTMCNC: 32.6 G/DL (ref 31.4–37.4)
MCV RBC AUTO: 93 FL (ref 82–98)
MONOCYTES # BLD AUTO: 0.2 THOUSAND/ÂΜL (ref 0.17–1.22)
MONOCYTES NFR BLD AUTO: 2 % (ref 4–12)
NEUTROPHILS # BLD AUTO: 10.62 THOUSANDS/ÂΜL (ref 1.85–7.62)
NEUTS SEG NFR BLD AUTO: 84 % (ref 43–75)
NRBC BLD AUTO-RTO: 0 /100 WBCS
P AXIS: 0 DEGREES
PLATELET # BLD AUTO: 470 THOUSANDS/UL (ref 149–390)
PMV BLD AUTO: 8.6 FL (ref 8.9–12.7)
PROT SERPL-MCNC: 7.2 G/DL (ref 6.4–8.4)
QRS AXIS: 0 DEGREES
QRSD INTERVAL: 0 MS
QT INTERVAL: 0 MS
QTC INTERVAL: 0 MS
RBC # BLD AUTO: 3.59 MILLION/UL (ref 3.81–5.12)
T WAVE AXIS: 0 DEGREES
VENTRICULAR RATE: 0 BPM
WBC # BLD AUTO: 12.46 THOUSAND/UL (ref 4.31–10.16)

## 2024-12-07 PROCEDURE — 84703 CHORIONIC GONADOTROPIN ASSAY: CPT

## 2024-12-07 PROCEDURE — 85025 COMPLETE CBC W/AUTO DIFF WBC: CPT

## 2024-12-07 PROCEDURE — 80076 HEPATIC FUNCTION PANEL: CPT

## 2024-12-07 PROCEDURE — 36415 COLL VENOUS BLD VENIPUNCTURE: CPT

## 2024-12-07 PROCEDURE — 93005 ELECTROCARDIOGRAM TRACING: CPT

## 2024-12-08 LAB
ATRIAL RATE: 87 BPM
PR INTERVAL: 146 MS
QRS AXIS: 16 DEGREES
QRSD INTERVAL: 74 MS
QT INTERVAL: 348 MS
QTC INTERVAL: 419 MS
T WAVE AXIS: 6 DEGREES
VENTRICULAR RATE: 87 BPM

## 2024-12-08 PROCEDURE — 93010 ELECTROCARDIOGRAM REPORT: CPT | Performed by: INTERNAL MEDICINE

## 2024-12-10 ENCOUNTER — OFFICE VISIT (OUTPATIENT)
Dept: PSYCHIATRY | Facility: CLINIC | Age: 19
End: 2024-12-10
Payer: COMMERCIAL

## 2024-12-10 DIAGNOSIS — F41.1 GENERALIZED ANXIETY DISORDER: ICD-10-CM

## 2024-12-10 DIAGNOSIS — F90.9 ADULT ADHD: Primary | ICD-10-CM

## 2024-12-10 DIAGNOSIS — F39 MOOD DISORDER (HCC): ICD-10-CM

## 2024-12-10 DIAGNOSIS — F40.10 SOCIAL ANXIETY DISORDER: ICD-10-CM

## 2024-12-10 PROCEDURE — 99214 OFFICE O/P EST MOD 30 MIN: CPT | Performed by: PHYSICIAN ASSISTANT

## 2024-12-10 RX ORDER — METHYLPHENIDATE HYDROCHLORIDE 36 MG/1
36 TABLET ORAL DAILY
Qty: 30 TABLET | Refills: 0 | Status: SHIPPED | OUTPATIENT
Start: 2024-12-10

## 2024-12-10 RX ORDER — SERTRALINE HYDROCHLORIDE 25 MG/1
25 TABLET, FILM COATED ORAL DAILY
Qty: 30 TABLET | Refills: 5 | Status: SHIPPED | OUTPATIENT
Start: 2024-12-10

## 2024-12-11 NOTE — BH TREATMENT PLAN
"TREATMENT PLAN (Medication Management Only)        [facility]    Name/Date of Birth/MRN:  Juliana Valdes 19 y.o. 2005 MRN: [id number]  Date of Treatment Plan: December 10, 2024  Diagnosis/Diagnoses:   1. Adult ADHD    2. Mood disorder (HCC)    3. Social anxiety disorder    4. Generalized anxiety disorder      Strengths/Personal Resources for Self-Care: \"My dog; I journal; music-- listening; I also draw\"  Area/Areas of need (in own words): \"I'm concerned with the level of Concerta, I was taking 36mg \".  1. Long Term Goal:   Maintain mood stability and control of ADHD and anxiety  Target Date: 6 months - 6/10/2025  Person/Persons responsible for completion of goal: Juliana and Danica  2.  Short Term Objective (s) - How will we reach this goal?:   A.  Provider new recommended medication/dosage changes and/or continue medication(s):  Pt is having ADHD Sxs, and mild but manageable depression and anxiety Sxs.  She denies recent manic type Sxs and stopped the Lamotrigine on her own, wanting to have therapy before she adds any mood medicine.   I will formally discontinue the Lamotrigine, though explained that therapy alone is not a Tx for manic Sxs control and Pt verbalized understanding.  She feels the mood and anxiety medicines are helping to a good degree at this time and appears stable.  She verbally promises to call me immediately if any manic type Sxs emerge.  I will increase the Methylphenidate ER to manage ADHD Sxs.  Tx options discussed and I will increase the Methylphenidate ER to 36mg qd.  She feels stable and appears so and will also wait to   D/C Lamotrigine   Increase Methylphenidate ER to 36mg (1) tab po qd # 30  Continue:   Sertraline 50mg + 25mg (1) tab po qd # 30 R5 each  Pt to have her psychotherapy intake appt with Gracy Stearns LCSW on 12/27/2024  Return 1/9/2024 at 4:30PM as scheduled.  Can call any time sooner prn.   Target Date: 6 months - " 6/10/2025  Person/Persons Responsible for Completion of Goal: Celine   Progress Towards Goals: stable, continuing treatment  Treatment Modality:  Medication mgt  Review due 180 days from date of this plan: 6 months - 6/10/2025  Expected length of service: ongoing treatment unless revised  My Physician/PA/NP and I have developed this plan together and I agree to work on the goals and objectives. I understand the treatment goals that were developed for my treatment.  Electronic Signatures: on file (unless signed below)    Danica Graham PA-C 12/10/24

## 2024-12-26 ENCOUNTER — TELEPHONE (OUTPATIENT)
Dept: PSYCHIATRY | Facility: CLINIC | Age: 19
End: 2024-12-26

## 2024-12-27 ENCOUNTER — OFFICE VISIT (OUTPATIENT)
Dept: BEHAVIORAL/MENTAL HEALTH CLINIC | Facility: CLINIC | Age: 19
End: 2024-12-27
Payer: COMMERCIAL

## 2024-12-27 DIAGNOSIS — F41.1 GENERALIZED ANXIETY DISORDER: Primary | ICD-10-CM

## 2024-12-27 DIAGNOSIS — F40.10 SOCIAL ANXIETY DISORDER: ICD-10-CM

## 2024-12-27 PROCEDURE — 90791 PSYCH DIAGNOSTIC EVALUATION: CPT
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
## 2025-01-02 ENCOUNTER — TELEPHONE (OUTPATIENT)
Age: 20
End: 2025-01-02

## 2025-01-02 NOTE — TELEPHONE ENCOUNTER
Patients mother called in to schedule the following weekly sessions with the patients provider:    1.13.25 @ 11:00 am  4.10.25 @ 9:00 am  4.17.25 @ 2:00 pm  4.24.25 @ 10:00am  5.1.25 @ 1:00pm    Patients mother advised to please see desk at check out from next appointment, to schedule the next weekly session.     Provider would like to see patient weekly, and patient was advised by the provider that appointments may be spotty until further established, due to availability..
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
## 2025-01-09 ENCOUNTER — OFFICE VISIT (OUTPATIENT)
Dept: PSYCHIATRY | Facility: CLINIC | Age: 20
End: 2025-01-09
Payer: COMMERCIAL

## 2025-01-09 VITALS — WEIGHT: 195.3 LBS | BODY MASS INDEX: 34.61 KG/M2 | HEIGHT: 63 IN

## 2025-01-09 DIAGNOSIS — F41.1 GENERALIZED ANXIETY DISORDER: Primary | ICD-10-CM

## 2025-01-09 DIAGNOSIS — F40.10 SOCIAL ANXIETY DISORDER: ICD-10-CM

## 2025-01-09 DIAGNOSIS — F90.9 ADULT ADHD: ICD-10-CM

## 2025-01-09 DIAGNOSIS — F39 MOOD DISORDER (HCC): ICD-10-CM

## 2025-01-09 DIAGNOSIS — G47.00 INSOMNIA, UNSPECIFIED TYPE: ICD-10-CM

## 2025-01-09 PROCEDURE — 99214 OFFICE O/P EST MOD 30 MIN: CPT | Performed by: PHYSICIAN ASSISTANT

## 2025-01-09 RX ORDER — METHYLPHENIDATE HYDROCHLORIDE 36 MG/1
36 TABLET ORAL DAILY
Qty: 30 TABLET | Refills: 0 | Status: SHIPPED | OUTPATIENT
Start: 2025-01-09

## 2025-01-09 RX ORDER — SERTRALINE HYDROCHLORIDE 100 MG/1
100 TABLET, FILM COATED ORAL DAILY
Qty: 30 TABLET | Refills: 2 | Status: SHIPPED | OUTPATIENT
Start: 2025-01-09

## 2025-01-09 RX ORDER — TRAZODONE HYDROCHLORIDE 50 MG/1
TABLET, FILM COATED ORAL
Qty: 30 TABLET | Refills: 2 | Status: SHIPPED | OUTPATIENT
Start: 2025-01-09

## 2025-01-13 ENCOUNTER — SOCIAL WORK (OUTPATIENT)
Dept: BEHAVIORAL/MENTAL HEALTH CLINIC | Facility: CLINIC | Age: 20
End: 2025-01-13
Payer: COMMERCIAL

## 2025-01-13 DIAGNOSIS — F39 MOOD DISORDER (HCC): ICD-10-CM

## 2025-01-13 DIAGNOSIS — F41.1 GENERALIZED ANXIETY DISORDER: Primary | ICD-10-CM

## 2025-01-13 PROCEDURE — 90837 PSYTX W PT 60 MINUTES: CPT

## 2025-01-13 NOTE — BH TREATMENT PLAN
"Outpatient Behavioral Health Psychotherapy Treatment Plan    Juliana Valdes  2005     Date of Initial Psychotherapy Assessment: 12/27/24   Date of Current Treatment Plan: 01/13/25  Treatment Plan Target Date: 7/13/25  Treatment Plan Expiration Date: 7/13/25    Diagnosis:   1. Generalized anxiety disorder        2. Mood disorder (HCC)        ADHD, unspecified type    Strengths: good work ethic,  loyal, creativity    Area(s) of Need: self-esteem, social skills, negative thoughts    Long Term Goal 1 (in the client's own words): \"I just wanna be heard\"; \"I want to learn how to treat myself kinder\"; increase clear understanding of sexuality    Stage of Change: Action    Target Date for completion: TBD     Anticipated therapeutic modalities: psychodynamic support and cognitive behavioral strategies     People identified to complete this goal: Olivia      Objective 1: (identify the means of measuring success in meeting the objective): 1) continue psychiatry and medications; 2) continue therapy; 3) identify goals for self related to education, social life, independence; 4) explore sexual identity and barriers to clarity       Objective 2: (identify the means of measuring success in meeting the objective): address ways to manage, accept, and/or idenitfy ways to  improve relationship with parents (consider parents joining in session)      I am currently under the care of a [facility] psychiatric provider: yes    My [facility] psychiatric provider is: Danica Graham PA-C    I am currently taking psychiatric medications: Yes, as prescribed    I feel that I will be ready for discharge from mental health care when I reach the following (measurable goal/objective): I don't know    For children and adults who have a legal guardian:   Has there been any change to custody orders and/or guardianship status? No. Mother and Father are reported by Juliana as her legal guardians If yes, attach updated documentation.    I have not " yet created my Crisis Plan and have not yet been offered a copy of this plan; to be completed in future session. Juliana confirms she is not suicidal, no active thoughts, intent or plan.     Behavioral Health Treatment Plan [facility]: Diagnosis and Treatment Plan explained to Juliana Barneslona Juliana Valdes acknowledges an understanding of their diagnosis. Juliana Valdes agrees to this treatment plan.    I have been offered a copy of this Treatment Plan. yes

## 2025-01-13 NOTE — PSYCH
Behavioral Health Psychotherapy Progress Note    Psychotherapy Provided: Individual Psychotherapy     1. Generalized anxiety disorder        2. Mood disorder (HCC)        3. ADHD    Goals addressed in session: Goal 1     DATA: 1st followup session. No questions since intake. Discussed and created treatment plan. Juliana discussed difficulty in family, where her siblings are the favorites and where she feels  judged and unaccepted by her parents. She noted feeling never good enough as she gets good grades and is still criticized. She discussed different treatment toward her siblings (triplets). She discussed questioning her sexuality, stating she is lesbian and how her parents are Shinto/[employer]ian and struggle with her sexual preference, but then herself wondering if her lesbianism is a form of rebellion. She discussed having a very good friend Kiki who accepts her for who she is, and how she feels most at ease when doing something creative. She stated she likes to draw, write, and has interest in anime and makeup. We discussed focus on self and areas in her life where she feels stuck, and discovering ways to feel 'unstuck'. We addressed potential for future sessions with mother to help address the relationship. Juliana stated she will consider for the future. Discussed her continuation with psychiatry, medication and monitoring, indicated that it is helping. She reaffirmed no suicidal thoughts, then later affirmed some 'passive' thoughts where she thinks, it wouldn't be horrible if she , but acknowledges hope that things can be better in the future. She denied active thought, intent or plan of self-harm or harm to others. We discussed but didn't have time to complete crisis plan; discussed plan to complete next session. Reviewed scheduling , added appointments, discussed avenue for outreach in the interim until next appointment.     [Will address if colleague [facility] group is still ongoing and if so, discuss next  "time]      During this session, this clinician used the following therapeutic modalities: Engagement Strategies, Cognitive Behavioral Therapy, Mindfulness-based Strategies, Motivational Interviewing, Solution-Focused Therapy, and Supportive Psychotherapy    Substance Abuse was not addressed during this session. If the client is diagnosed with a co-occurring substance use disorder, please indicate any changes in the frequency or amount of use: n/a. Stage of change for addressing substance use diagnoses: No substance use/Not applicable    ASSESSMENT:  Juliana Valdes presen/ants with a Euthymic/ normal and Anxious mood.     her affect is Normal range and intensity, which is congruent, with her mood and the content of the session. The client has made progress on their goals.    Juliana was talkative and able to verbalize areas of goal and struggle; she continues to demonstrate with some contradictory after thoughts, unclear process Juliana Valdes presents with a low risk of suicide, low risk of self-harm, and low risk of harm to others.    For any risk assessment that surpasses a \"low\" rating, a safety plan must be developed.    A safety plan was indicated: no  If yes, describe in detail to be completed in upcoming next session(s)    PLAN: Between sessions, Juliana Valdes will continue creative endeavors, self-talk. At the next session, the therapist will use Cognitive Behavioral Therapy to address continued mood and behavior management and stability; protocol safety plan.    Behavioral Health Treatment Plan and Discharge Planning: Juliana Valdes is aware of and agrees to continue to work on their treatment plan. They have identified and are working toward their discharge goals. yes    Depression Follow-up Plan Completed: Not applicable    Visit start and stop times:    01/13/25  Start Time: 1100  Stop Time: 1157  Total Visit Time: 57 minutes  "

## 2025-01-16 ENCOUNTER — OFFICE VISIT (OUTPATIENT)
Dept: FAMILY MEDICINE CLINIC | Facility: MEDICAL CENTER | Age: 20
End: 2025-01-16
Payer: COMMERCIAL

## 2025-01-16 ENCOUNTER — APPOINTMENT (OUTPATIENT)
Dept: LAB | Facility: MEDICAL CENTER | Age: 20
End: 2025-01-16
Payer: COMMERCIAL

## 2025-01-16 VITALS
DIASTOLIC BLOOD PRESSURE: 60 MMHG | SYSTOLIC BLOOD PRESSURE: 124 MMHG | WEIGHT: 196 LBS | RESPIRATION RATE: 18 BRPM | BODY MASS INDEX: 34.73 KG/M2 | HEIGHT: 63 IN | HEART RATE: 97 BPM | OXYGEN SATURATION: 97 % | TEMPERATURE: 98.3 F

## 2025-01-16 DIAGNOSIS — R06.02 SHORTNESS OF BREATH: ICD-10-CM

## 2025-01-16 DIAGNOSIS — D72.829 LEUKOCYTOSIS, UNSPECIFIED TYPE: Primary | ICD-10-CM

## 2025-01-16 DIAGNOSIS — Z75.8 DOES NOT HAVE PRIMARY CARE PROVIDER: ICD-10-CM

## 2025-01-16 DIAGNOSIS — D72.829 LEUKOCYTOSIS, UNSPECIFIED TYPE: ICD-10-CM

## 2025-01-16 DIAGNOSIS — Z00.00 ANNUAL PHYSICAL EXAM: Primary | ICD-10-CM

## 2025-01-16 DIAGNOSIS — Z23 ENCOUNTER FOR IMMUNIZATION: ICD-10-CM

## 2025-01-16 DIAGNOSIS — J18.9 PNEUMONIA OF LEFT LOWER LOBE DUE TO INFECTIOUS ORGANISM: ICD-10-CM

## 2025-01-16 LAB
BASOPHILS # BLD AUTO: 0.06 THOUSANDS/ΜL (ref 0–0.1)
BASOPHILS NFR BLD AUTO: 1 % (ref 0–1)
EOSINOPHIL # BLD AUTO: 0.08 THOUSAND/ΜL (ref 0–0.61)
EOSINOPHIL NFR BLD AUTO: 1 % (ref 0–6)
ERYTHROCYTE [DISTWIDTH] IN BLOOD BY AUTOMATED COUNT: 13 % (ref 11.6–15.1)
HCT VFR BLD AUTO: 40.1 % (ref 34.8–46.1)
HGB BLD-MCNC: 13.1 G/DL (ref 11.5–15.4)
IMM GRANULOCYTES # BLD AUTO: 0.02 THOUSAND/UL (ref 0–0.2)
IMM GRANULOCYTES NFR BLD AUTO: 0 % (ref 0–2)
LYMPHOCYTES # BLD AUTO: 2.05 THOUSANDS/ΜL (ref 0.6–4.47)
LYMPHOCYTES NFR BLD AUTO: 32 % (ref 14–44)
MCH RBC QN AUTO: 29.1 PG (ref 26.8–34.3)
MCHC RBC AUTO-ENTMCNC: 32.7 G/DL (ref 31.4–37.4)
MCV RBC AUTO: 89 FL (ref 82–98)
MONOCYTES # BLD AUTO: 0.6 THOUSAND/ΜL (ref 0.17–1.22)
MONOCYTES NFR BLD AUTO: 9 % (ref 4–12)
NEUTROPHILS # BLD AUTO: 3.55 THOUSANDS/ΜL (ref 1.85–7.62)
NEUTS SEG NFR BLD AUTO: 57 % (ref 43–75)
NRBC BLD AUTO-RTO: 0 /100 WBCS
PLATELET # BLD AUTO: 337 THOUSANDS/UL (ref 149–390)
PMV BLD AUTO: 9 FL (ref 8.9–12.7)
RBC # BLD AUTO: 4.5 MILLION/UL (ref 3.81–5.12)
WBC # BLD AUTO: 6.36 THOUSAND/UL (ref 4.31–10.16)

## 2025-01-16 PROCEDURE — 99213 OFFICE O/P EST LOW 20 MIN: CPT | Performed by: STUDENT IN AN ORGANIZED HEALTH CARE EDUCATION/TRAINING PROGRAM

## 2025-01-16 PROCEDURE — 90471 IMMUNIZATION ADMIN: CPT | Performed by: STUDENT IN AN ORGANIZED HEALTH CARE EDUCATION/TRAINING PROGRAM

## 2025-01-16 PROCEDURE — 36415 COLL VENOUS BLD VENIPUNCTURE: CPT

## 2025-01-16 PROCEDURE — 85025 COMPLETE CBC W/AUTO DIFF WBC: CPT

## 2025-01-16 PROCEDURE — 99385 PREV VISIT NEW AGE 18-39: CPT | Performed by: STUDENT IN AN ORGANIZED HEALTH CARE EDUCATION/TRAINING PROGRAM

## 2025-01-16 PROCEDURE — 90656 IIV3 VACC NO PRSV 0.5 ML IM: CPT | Performed by: STUDENT IN AN ORGANIZED HEALTH CARE EDUCATION/TRAINING PROGRAM

## 2025-01-16 NOTE — PROGRESS NOTES
[facility] HEALTH MAINTENANCE OFFICE VISIT  [facility] - [facility] [place]    NAME: Juliana Valdes  AGE: 19 y.o. SEX: female  : 2005     DATE: 2025    Assessment and Plan     1. Annual physical exam  2. Encounter for immunization  -     influenza vaccine preservative-free 0.5 mL IM (Fluzone, Afluria, Fluarix, Flulaval)  3. Pneumonia of left lower lobe due to infectious organism  -     Ambulatory Referral to [facility]  4. Shortness of breath  -     Ambulatory Referral to [facility]  5. Does not have primary care provider  -     Ambulatory Referral to [facility]  6. Leukocytosis, unspecified type  -2024 CBC ordered by her psychiatrist reviewed, elevated white blood cell count and platelet count, likely related to recent pneumonia, she was appropriately treated and clinically improved, will recheck CBC  -     CBC (Includes Diff/Plt) (Refl); Future; Expected date: 2025  -     CBC (Includes Diff/Plt) (Refl)      Patient Counseling:   Nutrition: Stressed importance of a well balanced diet, moderation of sodium/saturated fat, caloric balance and sufficient intake of fiber  Exercise: Stressed the importance of regular exercise with a goal of 150 minutes per week  Dental Health: Discussed daily flossing and brushing and regular dental visits   Immunizations reviewed:   Counseled about influenza vaccine, she would like to receive today  Discussed benefits of:    Established with gynecology  No family history of colon cancer    BMI Counseling: Body mass index is 35.28 kg/m². Discussed with patient's BMI with her.     Keep regular scheduled follow-up with her psychiatrist.  Follow-up in 1 year for annual physical and sooner as needed.  Chief Complaint     Chief Complaint   Patient presents with    \[facility]\"" Care     Urgent care f/u    Physical Exam       History of Present Illness     HPI    Well Adult Physical   Patient here for a comprehensive physical  exam.      Diet and Physical Activity  Diet: well balanced diet  Exercise: infrequently      Depression Screen  PHQ-2/9 Depression Screening    Little interest or pleasure in doing things: 0 - not at all  Feeling down, depressed, or hopeless: 0 - not at all  PHQ-2 Score: 0  PHQ-2 Interpretation: Negative depression screen          General Health  Hearing: Normal:  bilateral  Vision: goes for regular eye exams and wears glasses  Dental: regular dental visits, brushes teeth twice daily, and does not floss    Reproductive Health  Follows with gynecologist      The following portions of the patient's history were reviewed and updated as appropriate: allergies, current medications, past family history, past medical history, past social history, past surgical history and problem list.    Review of Systems     Review of Systems    As noted in HPI     Past Medical History     Past Medical History:   Diagnosis Date    ADHD (attention deficit hyperactivity disorder)     Anxiety     Depression        Past Surgical History     History reviewed. No pertinent surgical history.    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   Tobacco Use    Smoking status: Never    Smokeless tobacco: Never   Vaping Use    Vaping status: Never Used   Substance and Sexual Activity    Alcohol use: Never    Drug use: Not Currently    Sexual activity: None   Other Topics Concern    None   Social History Narrative    Home: Lives with parents and 2 siblings    No children         Social Drivers of Health     Financial Resource Strain: Not on file   Food Insecurity: Not on file   Transportation Needs: Not on file   Physical Activity: Not on file   Stress: Not on file   Social Connections: Unknown (6/18/2024)    Received from  "[facility]    Social Connections     How often do you feel lonely or isolated from those around you? (Adult - for ages 18 years and over): Not on file   Intimate Partner Violence: Not on file   Housing Stability: Not on file       Family History     Family History   Problem Relation Age of Onset    No Known Problems Mother     No Known Problems Sister     No Known Problems Brother     Frontotemporal dementia Maternal Grandmother     Breast cancer Paternal Grandmother     Alcohol abuse Maternal Uncle     Alcohol abuse Maternal Uncle     Colon cancer Neg Hx     Ovarian cancer Neg Hx     Uterine cancer Neg Hx     Cervical cancer Neg Hx        Current Medications       Current Outpatient Medications:     albuterol (ProAir HFA) 90 mcg/act inhaler, Inhale 2 puffs every 4 (four) hours as needed for wheezing or shortness of breath, Disp: 8.5 g, Rfl: 0    levocetirizine (XYZAL) 5 MG tablet, Take 5 mg by mouth daily, Disp: , Rfl:     methylphenidate (CONCERTA) 36 MG ER tablet, Take 1 tablet (36 mg total) by mouth daily For ongoing therapy Max Daily Amount: 36 mg, Disp: 30 tablet, Rfl: 0    methylphenidate (CONCERTA) 36 MG ER tablet, Take 1 tablet (36 mg total) by mouth daily For ongoing therapy Max Daily Amount: 36 mg, Disp: 30 tablet, Rfl: 0    norgestimate-ethinyl estradiol (ORTHO TRI-CYCLEN,TRINESSA) 0.18/0.215/0.25 MG-35 MCG per tablet, TAKE 1 TABLET BY MOUTH EVERY DAY, Disp: 84 tablet, Rfl: 0    sertraline (ZOLOFT) 100 mg tablet, Take 1 tablet (100 mg total) by mouth daily, Disp: 30 tablet, Rfl: 2    traZODone (DESYREL) 50 mg tablet, Take 1/2 - 1 tab po qhs prn insomnia, Disp: 30 tablet, Rfl: 2     Allergies     Allergies   Allergen Reactions    Cat Hair Extract Other (See Comments)     Cats only       Objective     /60 (BP Location: Left arm, Patient Position: Sitting, Cuff Size: Large)   Pulse 97   Temp 98.3 °F (36.8 °C) (Temporal)   Resp 18   Ht 5' 2.5\" (1.588 m)   Wt 88.9 kg (196 lb)   LMP 01/09/2025 " (Approximate)   SpO2 97%   BMI 35.28 kg/m²      Physical Exam  Vitals reviewed.   Constitutional:       General: She is not in acute distress.     Appearance: Normal appearance.   HENT:      Head: Normocephalic and atraumatic.      Right Ear: External ear normal.      Left Ear: External ear normal.      Nose: Nose normal.      Mouth/Throat:      Mouth: Mucous membranes are moist.      Pharynx: Oropharynx is clear.   Eyes:      Extraocular Movements: Extraocular movements intact.      Conjunctiva/sclera: Conjunctivae normal.      Pupils: Pupils are equal, round, and reactive to light.   Cardiovascular:      Rate and Rhythm: Normal rate and regular rhythm.      Pulses: Normal pulses.      Heart sounds: Normal heart sounds.   Pulmonary:      Effort: Pulmonary effort is normal. No respiratory distress.      Breath sounds: Normal breath sounds. No decreased breath sounds or wheezing.   Abdominal:      General: Abdomen is flat. Bowel sounds are normal.      Palpations: Abdomen is soft.      Tenderness: There is no abdominal tenderness.   Musculoskeletal:      Cervical back: Neck supple.      Right lower leg: No edema.      Left lower leg: No edema.   Lymphadenopathy:      Cervical: No cervical adenopathy.   Skin:     General: Skin is warm and dry.   Neurological:      Mental Status: She is alert and oriented to person, place, and time.   Psychiatric:         Mood and Affect: Mood normal.         Behavior: Behavior normal.         Thought Content: Thought content normal.         Judgment: Judgment normal.               Jarrell Styles DO  [facility]

## 2025-01-17 ENCOUNTER — RESULTS FOLLOW-UP (OUTPATIENT)
Dept: FAMILY MEDICINE CLINIC | Facility: MEDICAL CENTER | Age: 20
End: 2025-01-17

## 2025-02-02 DIAGNOSIS — F39 MOOD DISORDER (HCC): ICD-10-CM

## 2025-02-02 DIAGNOSIS — F41.1 GENERALIZED ANXIETY DISORDER: ICD-10-CM

## 2025-02-02 DIAGNOSIS — F40.10 SOCIAL ANXIETY DISORDER: ICD-10-CM

## 2025-02-02 DIAGNOSIS — G47.00 INSOMNIA, UNSPECIFIED TYPE: ICD-10-CM

## 2025-02-03 RX ORDER — TRAZODONE HYDROCHLORIDE 50 MG/1
25-50 TABLET, FILM COATED ORAL
Qty: 90 TABLET | Refills: 0 | Status: SHIPPED | OUTPATIENT
Start: 2025-02-03

## 2025-02-03 RX ORDER — SERTRALINE HYDROCHLORIDE 100 MG/1
100 TABLET, FILM COATED ORAL DAILY
Qty: 90 TABLET | Refills: 0 | Status: SHIPPED | OUTPATIENT
Start: 2025-02-03

## 2025-02-16 RX ORDER — HYDROCODONE BITARTRATE AND ACETAMINOPHEN 5; 325 MG/1; MG/1
1 TABLET ORAL EVERY 6 HOURS PRN
Refills: 0 | Status: CANCELLED | OUTPATIENT
Start: 2025-02-16
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
## 2025-02-18 DIAGNOSIS — N94.6 DYSMENORRHEA: ICD-10-CM

## 2025-02-18 DIAGNOSIS — R79.89 ELEVATED TESTOSTERONE LEVEL IN FEMALE: ICD-10-CM

## 2025-02-18 RX ORDER — NORGESTIMATE AND ETHINYL ESTRADIOL 7DAYSX3 28
1 KIT ORAL DAILY
Qty: 84 TABLET | Refills: 0 | OUTPATIENT
Start: 2025-02-18

## 2025-02-18 NOTE — TELEPHONE ENCOUNTER
She needs an appt for refills. Last seen in office 11/23. It could be a short 15 minute follow up and her annual can be completed as scheduled.

## 2025-02-19 DIAGNOSIS — R79.89 ELEVATED TESTOSTERONE LEVEL IN FEMALE: ICD-10-CM

## 2025-02-19 DIAGNOSIS — N94.6 DYSMENORRHEA: ICD-10-CM

## 2025-02-19 RX ORDER — NORGESTIMATE AND ETHINYL ESTRADIOL 7DAYSX3 28
1 KIT ORAL DAILY
Qty: 28 TABLET | Refills: 0 | Status: SHIPPED | OUTPATIENT
Start: 2025-02-19

## 2025-02-20 ENCOUNTER — OFFICE VISIT (OUTPATIENT)
Dept: PSYCHIATRY | Facility: CLINIC | Age: 20
End: 2025-02-20
Payer: COMMERCIAL

## 2025-02-20 VITALS — HEIGHT: 63 IN | WEIGHT: 199.3 LBS | BODY MASS INDEX: 35.31 KG/M2

## 2025-02-20 DIAGNOSIS — F90.9 ADULT ADHD: ICD-10-CM

## 2025-02-20 DIAGNOSIS — F40.10 SOCIAL ANXIETY DISORDER: ICD-10-CM

## 2025-02-20 DIAGNOSIS — G47.00 INSOMNIA, UNSPECIFIED TYPE: ICD-10-CM

## 2025-02-20 DIAGNOSIS — F39 MOOD DISORDER (HCC): ICD-10-CM

## 2025-02-20 DIAGNOSIS — F41.1 GENERALIZED ANXIETY DISORDER: Primary | ICD-10-CM

## 2025-02-20 PROCEDURE — 99214 OFFICE O/P EST MOD 30 MIN: CPT | Performed by: PHYSICIAN ASSISTANT

## 2025-02-20 RX ORDER — METHYLPHENIDATE HYDROCHLORIDE 36 MG/1
36 TABLET ORAL DAILY
Qty: 30 TABLET | Refills: 0 | Status: SHIPPED | OUTPATIENT
Start: 2025-02-20

## 2025-02-20 RX ORDER — TRAZODONE HYDROCHLORIDE 50 MG/1
75-100 TABLET, FILM COATED ORAL
Qty: 90 TABLET | Refills: 0 | Status: SHIPPED | OUTPATIENT
Start: 2025-02-20

## 2025-02-20 NOTE — ASSESSMENT & PLAN NOTE
Orders:    traZODone (DESYREL) 50 mg tablet; Take 1.5-2 tablets ( mg total) by mouth daily at bedtime as needed for sleep

## 2025-02-20 NOTE — TELEPHONE ENCOUNTER
Rx refilled for one month and sent to pharmacy on file.  It can be refilled in March to get her to her annual exam. Thanks.

## 2025-03-14 DIAGNOSIS — F39 MOOD DISORDER (HCC): ICD-10-CM

## 2025-03-14 DIAGNOSIS — F41.1 GENERALIZED ANXIETY DISORDER: ICD-10-CM

## 2025-03-14 DIAGNOSIS — F40.10 SOCIAL ANXIETY DISORDER: ICD-10-CM

## 2025-03-17 RX ORDER — SERTRALINE HYDROCHLORIDE 100 MG/1
100 TABLET, FILM COATED ORAL DAILY
Qty: 90 TABLET | Refills: 0 | Status: SHIPPED | OUTPATIENT
Start: 2025-03-17

## 2025-03-20 ENCOUNTER — OFFICE VISIT (OUTPATIENT)
Dept: OBGYN CLINIC | Facility: MEDICAL CENTER | Age: 20
End: 2025-03-20
Payer: COMMERCIAL

## 2025-03-20 VITALS — WEIGHT: 199 LBS | SYSTOLIC BLOOD PRESSURE: 122 MMHG | DIASTOLIC BLOOD PRESSURE: 80 MMHG | BODY MASS INDEX: 35.82 KG/M2

## 2025-03-20 DIAGNOSIS — N94.6 DYSMENORRHEA: ICD-10-CM

## 2025-03-20 PROCEDURE — 99213 OFFICE O/P EST LOW 20 MIN: CPT | Performed by: NURSE PRACTITIONER

## 2025-03-20 RX ORDER — NORGESTIMATE AND ETHINYL ESTRADIOL 7DAYSX3 28
1 KIT ORAL DAILY
Qty: 28 TABLET | Refills: 3 | Status: SHIPPED | OUTPATIENT
Start: 2025-03-20

## 2025-03-20 NOTE — PATIENT INSTRUCTIONS
Take birth control as directed.   Rx sent to pharmacy on file.  ACHES reviewed.   Aware of benefits, risks and alternatives of birth control.   Exercise 150 minutes per week minimum.   Always condom use for STI protection.   Return to office within for 4 months for annual exam.

## 2025-03-20 NOTE — PROGRESS NOTES
Assessment/Plan:  Take birth control as directed.   Rx sent to pharmacy on file.  ACHES reviewed.   Aware of benefits, risks and alternatives of birth control.   Exercise 150 minutes per week minimum.   Always condom use for STI protection.   Return to office within for 4 months for annual exam.        1. Dysmenorrhea  -     norgestimate-ethinyl estradiol (ORTHO TRI-CYCLEN,TRINESSA) 0.18/0.215/0.25 MG-35 MCG per tablet; Take 1 tablet by mouth daily             Subjective:      Patient ID: Juliana Valdes is a 19 y.o. female.    HPI    Juliana Valdes is a 19 y.o. No obstetric history on file. female who is here today for a follow up visit.  No health concerns.   Last in office 11/2/23 for her annual exam.   Hx of PCOS and currently being treated with [facility].   Compliant with her [employer]  [employer] student and commutes from home.   Not currently exercising.     Monthly menses x 4 days with heavy flow x 2 days then mod to light flow. Menses is acceptable. Has never been sexually active or currently dating.   She uses OTC  for contraception.    She is not interested in STD screening today.     The following portions of the patient's history were reviewed and updated as appropriate: allergies, current medications, past family history, past medical history, past social history, past surgical history, and problem list.    Review of Systems   Constitutional: Negative.  Negative for activity change, appetite change, chills, diaphoresis, fatigue, fever and unexpected weight change.   HENT:  Negative for congestion, dental problem, sneezing, sore throat and trouble swallowing.    Eyes:  Negative for visual disturbance.   Respiratory:  Negative for chest tightness and shortness of breath.    Cardiovascular:  Negative for chest pain and leg swelling.   Gastrointestinal:  Negative for abdominal pain, constipation, diarrhea, nausea and vomiting.   Genitourinary:  Negative for difficulty urinating, dyspareunia, dysuria, frequency, hematuria, menstrual  problem, pelvic pain, urgency, vaginal bleeding, vaginal discharge and vaginal pain.   Musculoskeletal:  Negative for back pain and neck pain.   Skin: Negative.    Allergic/Immunologic: Negative.    Neurological:  Negative for weakness and headaches.   Hematological:  Negative for adenopathy.   Psychiatric/Behavioral: Negative.           Objective:      /80 (BP Location: Left arm, Patient Position: Sitting, Cuff Size: Standard)   Wt 90.3 kg (199 lb)   LMP 03/01/2025 (Approximate)   BMI 35.82 kg/m²          Physical Exam  Vitals and nursing note reviewed.   Constitutional:       Appearance: Normal appearance. She is well-developed.   Skin:     General: Skin is warm and dry.   Neurological:      Mental Status: She is alert and oriented to person, place, and time.   Psychiatric:         Mood and Affect: Mood normal.         Behavior: Behavior normal.
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
## 2025-04-03 ENCOUNTER — OFFICE VISIT (OUTPATIENT)
Dept: PSYCHIATRY | Facility: CLINIC | Age: 20
End: 2025-04-03
Payer: COMMERCIAL

## 2025-04-03 VITALS — WEIGHT: 196.4 LBS | HEIGHT: 63 IN | BODY MASS INDEX: 34.8 KG/M2

## 2025-04-03 DIAGNOSIS — F41.1 GENERALIZED ANXIETY DISORDER: ICD-10-CM

## 2025-04-03 DIAGNOSIS — F39 MOOD DISORDER (HCC): ICD-10-CM

## 2025-04-03 DIAGNOSIS — G47.00 INSOMNIA, UNSPECIFIED TYPE: ICD-10-CM

## 2025-04-03 DIAGNOSIS — F90.9 ADULT ADHD: Primary | ICD-10-CM

## 2025-04-03 DIAGNOSIS — F40.10 SOCIAL ANXIETY DISORDER: ICD-10-CM

## 2025-04-03 PROCEDURE — 99214 OFFICE O/P EST MOD 30 MIN: CPT | Performed by: PHYSICIAN ASSISTANT

## 2025-04-03 RX ORDER — TRAZODONE HYDROCHLORIDE 50 MG/1
75-100 TABLET ORAL
Qty: 180 TABLET | Refills: 0 | Status: SHIPPED | OUTPATIENT
Start: 2025-04-03

## 2025-04-03 RX ORDER — METHYLPHENIDATE HYDROCHLORIDE 54 MG/1
54 TABLET ORAL DAILY
Qty: 30 TABLET | Refills: 0 | Status: SHIPPED | OUTPATIENT
Start: 2025-04-03

## 2025-04-10 ENCOUNTER — SOCIAL WORK (OUTPATIENT)
Dept: BEHAVIORAL/MENTAL HEALTH CLINIC | Facility: CLINIC | Age: 20
End: 2025-04-10
Payer: COMMERCIAL

## 2025-04-10 DIAGNOSIS — F40.10 SOCIAL ANXIETY DISORDER: ICD-10-CM

## 2025-04-10 DIAGNOSIS — F41.1 GENERALIZED ANXIETY DISORDER: ICD-10-CM

## 2025-04-10 DIAGNOSIS — F39 MOOD DISORDER (HCC): Primary | ICD-10-CM

## 2025-04-10 PROCEDURE — 90837 PSYTX W PT 60 MINUTES: CPT

## 2025-04-10 NOTE — PSYCH
Behavioral Health Psychotherapy Progress Note    Psychotherapy Provided: Individual Psychotherapy     1. Mood disorder (HCC)        2. Generalized anxiety disorder        3. Social anxiety disorder            Goals addressed in session: Goal 1     DATA: Juliana came in holding her Plushy Jerry. She discussed school, family and personal issues. When asked, she stated wanting to discuss family. She discussed recent stress where mother's side of the family (mother siblings) are in some sort of discord, involving legal action, and affecting Juliana being able to continue friendship/relationship with her 21yo cousin Veronique. Juliana stated this relationship has always been positive, and how Veronique accepts her for who she is. This led to Juliana discussing more of the history of discontent with differential treatment from her parents with her triplet siblings. She discussed her anger and we acknowledged connection to other feelings. She addressed its affect on her view of herself. We noted the contradiction of wanting to be validated and accepted for who she is, while also have self-discontent for who she is. We began to explore ways she might believe she could work on herself (eg, 'I push people away'), while also validating the affects of how she grew up, and the feelings of inequity. Juliana was able to verbalize goals to have her own place, with pink and soft and comfortable things, living with her pets. We explored these thoughts. We agreed toward further discussion regarding her goals for herself in life, working on ways toward feeling better about herself, and addressing her feelings of anger. She stated doing some journaling and maybe will bring it in. We confirmed appointments scheduled; provider to add further ones when schedule opens back up.       During this session, this clinician used the following therapeutic modalities: Engagement Strategies, Cognitive Behavioral Therapy, and Supportive Psychotherapy    Substance Abuse was  "not addressed during this session. If the client is diagnosed with a co-occurring substance use disorder, please indicate any changes in the frequency or amount of use: n/a. Stage of change for addressing substance use diagnoses: No substance use/Not applicable    ASSESSMENT:  Juliana Valdes presents with a Euthymic/ normal, Anxious, and Depressed, angry/contained mood.     her affect is Normal range and intensity, which is congruent, with her mood and the content of the session. The client has made progress on their goals.    Juliana was able to verbalize feelings of anger; able to note goals for herself Juliana Valdes presents with a low risk of suicide, low risk of self-harm, and low risk of harm to others. Juliana acknowledged at times feeling about not living, but denies active thought, intent or plan to kill herself. She noted goals for herself in the future.     For any risk assessment that surpasses a \"low\" rating, a safety plan must be developed.    A safety plan was indicated: no  If yes, describe in detail to be completed at next session    PLAN: Between sessions, Juliana Valdes will consider discussion today; ways to address anger; write down angry thoughts/continue journaling. At the next session, the therapist will use Cognitive Behavioral Therapy and Supportive Psychotherapy to address feelings and goals; complete safety plan.    Behavioral Health Treatment Plan and Discharge Planning: Juliana Valdes is aware of and agrees to continue to work on their treatment plan. They have identified and are working toward their discharge goals. yes    Depression Follow-up Plan Completed: Not applicable    Visit start and stop times:    04/10/25  Start Time: 0905  Stop Time: 0959  Total Visit Time: 54 minutes  "

## 2025-04-17 ENCOUNTER — SOCIAL WORK (OUTPATIENT)
Dept: BEHAVIORAL/MENTAL HEALTH CLINIC | Facility: CLINIC | Age: 20
End: 2025-04-17
Payer: COMMERCIAL

## 2025-04-17 DIAGNOSIS — F39 MOOD DISORDER (HCC): ICD-10-CM

## 2025-04-17 DIAGNOSIS — F41.1 GENERALIZED ANXIETY DISORDER: Primary | ICD-10-CM

## 2025-04-17 DIAGNOSIS — F40.10 SOCIAL ANXIETY DISORDER: ICD-10-CM

## 2025-04-17 PROCEDURE — 90837 PSYTX W PT 60 MINUTES: CPT

## 2025-04-17 NOTE — PSYCH
Behavioral Health Psychotherapy Progress Note    Psychotherapy Provided: Individual Psychotherapy     1. Generalized anxiety disorder        2. Mood disorder (HCC)        3. Social anxiety disorder            Goals addressed in session: Goal 1     DATA: Juliana brought in her journal and read entries, and showed provider specific drawings. We addressed and explored the writings and drawings and feelings expressed. Themes of being misunderstood, angry at being treated differently, not relating to others, feeling punished and judged for being different, feeling like the outcast in her nuclear family. She shared how these writings were in the past and how she has progressed from those exact feelings. She was unable to articulate how she did progress and change, but acknowledged getting to a place where she actually does look forward to things and want to do things in life and have goals for herself. We reviewed Juliana's continued benefit from journaling, how she vents and gets her feelings out in a healthy way, not hurting herself or anyone, and chooses not to hurt herself. She denied active thought of suicide and 'wouldn't do that'.  Juliana noted having small set of people in her life who she wants to interact with and who 'get her'.  She shared having happy moments and feeling good about that. We addressed her sharing her journal in session. She stated knowing she won't be judged here. We addressed how therapy is her time and for her. We did address how her mother brings her to therapy and wanted her in therapy. We discussed parents' questioning of the content and managing such questions.  We addressed how the process of therapy can perhaps help in their relationship and communication. She expressed desire for parents/family to: stop making fun of me; show some interest in my interests. She noted excitement about giving presentations and upcoming one at school.    We discussed continued therapy process, looked at schedule and  "discussed next time to complete safety plan and purpose. She will bring next year school schedule to allow for future appointment scheduling.   During this session, this clinician used the following therapeutic modalities: Engagement Strategies, Cognitive Behavioral Therapy, Mindfulness-based Strategies, Motivational Interviewing, Solution-Focused Therapy, and Supportive Psychotherapy    Substance Abuse was not addressed during this session. If the client is diagnosed with a co-occurring substance use disorder, please indicate any changes in the frequency or amount of use: n/a. Stage of change for addressing substance use diagnoses: No substance use/Not applicable    ASSESSMENT:  Juliana Valdes presents with a Euthymic/ normal and Anxious mood.     her affect is Normal range and intensity and Overbright, pressured somewhat which is congruent, with her mood and the content of the session. The client has made progress on their goals.    Juliana was able to verbalize progress and growth for herself Juliana Valdes presents with a low risk of suicide, low risk of self-harm, and low risk of harm to others. Denied.    For any risk assessment that surpasses a \"low\" rating, a safety plan must be developed.    A safety plan was indicated: no  If yes, describe in detail to be completed in upcoming sessions    PLAN: Between sessions, Juliana Valdes will continue journaling and other healthy coping sources. At the next session, the therapist will use Cognitive Behavioral Therapy and Supportive Psychotherapy to address continued mood stability.    Behavioral Health Treatment Plan and Discharge Planning: Juliana Valdes is aware of and agrees to continue to work on their treatment plan. They have identified and are working toward their discharge goals. yes    Depression Follow-up Plan Completed: Not applicable    Visit start and stop times:    04/17/25  Start Time: 1504  Stop Time: 1559  Total Visit Time: 55 minutes  "

## 2025-04-24 ENCOUNTER — TELEPHONE (OUTPATIENT)
Age: 20
End: 2025-04-24

## 2025-04-24 NOTE — TELEPHONE ENCOUNTER
Patient is calling regarding cancelling an appointment.    Date/Time: 4/24/25 at 10    Provider was notified    Reason: Patient is sick    Patient was rescheduled: YES [] NO [x]  If yes, when was Patient reschedule for: Patient will be at her next scheduled appt on 5/1/25.    Patient requesting call back to reschedule: YES [] NO [x]

## 2025-05-01 ENCOUNTER — SOCIAL WORK (OUTPATIENT)
Dept: BEHAVIORAL/MENTAL HEALTH CLINIC | Facility: CLINIC | Age: 20
End: 2025-05-01
Payer: COMMERCIAL

## 2025-05-01 DIAGNOSIS — F40.10 SOCIAL ANXIETY DISORDER: Primary | ICD-10-CM

## 2025-05-01 DIAGNOSIS — F41.1 GENERALIZED ANXIETY DISORDER: ICD-10-CM

## 2025-05-01 DIAGNOSIS — F39 MOOD DISORDER (HCC): ICD-10-CM

## 2025-05-01 PROCEDURE — 90837 PSYTX W PT 60 MINUTES: CPT

## 2025-05-01 NOTE — PSYCH
"Behavioral Health Psychotherapy Progress Note    Psychotherapy Provided: Individual Psychotherapy     1. Social anxiety disorder        2. Generalized anxiety disorder        3. Mood disorder (HCC)            Goals addressed in session: Goal 1     DATA: Juliana discussed death of maternal grandmother. We spent the session addressing her thoughts and feelings and connection to her grandmother, who accepted her for who she is. She shared memories of playing in the yard, catching frogs, and being herself, showing her artwork and remembering a free feeling. We addressed grief, and the services scheduled for tomorrow. We addressed her thoughts and feelings in navigating this experience, as she indicated needing privacy to express and feel her feelings. We addressed honoring the relationship with her grandmother, and grief process for Juliana. She addressed past grandparent deaths.     Discussed, but Unable to complete safety plan due to topic      During this session, this clinician used the following therapeutic modalities: Engagement Strategies, Bereavement Therapy, and Supportive Psychotherapy    Substance Abuse was not addressed during this session. If the client is diagnosed with a co-occurring substance use disorder, please indicate any changes in the frequency or amount of use: n/a. Stage of change for addressing substance use diagnoses: No substance use/Not applicable    ASSESSMENT:  Juliana Valdes presents with a Euthymic/ normal, Angry, Anxious, and Dysthymic mood.     her affect is Normal range and intensity and Tearful, which is congruent, with her mood and the content of the session. The client has made progress on their goals.    Juliana used therapy to address and experience thoughts and feelings Juliana Valdes presents with a low risk of suicide, low risk of self-harm, and low risk of harm to others.    For any risk assessment that surpasses a \"low\" rating, a safety plan must be developed.    A safety plan was indicated: " no  If yes, describe in detail n/a    PLAN: Between sessions, Juliana Valdes will consider discussion, thoughts, feelings in approaching upcoming services. At the next session, the therapist will use Bereavement Therapy, Cognitive Behavioral Therapy, and Supportive Psychotherapy to address ongoing relationship navigation.    Behavioral Health Treatment Plan and Discharge Planning: Juliana Valdes is aware of and agrees to continue to work on their treatment plan. They have identified and are working toward their discharge goals. yes    Depression Follow-up Plan Completed: Not applicable    Visit start and stop times:    05/01/25  Start Time: 1303  Stop Time: 1359  Total Visit Time: 56 minutes

## 2025-05-07 ENCOUNTER — TELEPHONE (OUTPATIENT)
Age: 20
End: 2025-05-07

## 2025-05-07 NOTE — TELEPHONE ENCOUNTER
Patient is calling regarding cancelling an appointment.    Date/Time: 5/8/25 at 11a    Reason: school make up    Patient was rescheduled: YES [x] NO []  If yes, when was Patient reschedule for: 5/15/25    Patient requesting call back to reschedule: YES [] NO [x]

## 2025-05-13 DIAGNOSIS — G47.00 INSOMNIA, UNSPECIFIED TYPE: ICD-10-CM

## 2025-05-13 RX ORDER — TRAZODONE HYDROCHLORIDE 50 MG/1
25-50 TABLET ORAL
Qty: 90 TABLET | OUTPATIENT
Start: 2025-05-13

## 2025-05-15 ENCOUNTER — SOCIAL WORK (OUTPATIENT)
Dept: BEHAVIORAL/MENTAL HEALTH CLINIC | Facility: CLINIC | Age: 20
End: 2025-05-15
Payer: COMMERCIAL

## 2025-05-15 DIAGNOSIS — F39 MOOD DISORDER (HCC): ICD-10-CM

## 2025-05-15 DIAGNOSIS — F41.1 GENERALIZED ANXIETY DISORDER: Primary | ICD-10-CM

## 2025-05-15 DIAGNOSIS — F40.10 SOCIAL ANXIETY DISORDER: ICD-10-CM

## 2025-05-15 PROCEDURE — 90837 PSYTX W PT 60 MINUTES: CPT

## 2025-05-15 NOTE — BH CRISIS PLAN
"Client Name: Juliana Valdes       Client YOB: 2005    [place] Safety Plan      Creation Date: 5/15/25 Update Date: 5/15/25   Created By: Gracy Stearns LCSW Last Updated By: Gracy Stearns LCSW      Step 1: Warning Signs:   Warning Signs   isolate   don't go on social media   irritable   sleep worsens   give up on tasks            Step 2: Internal Coping Strategies:   Internal Coping Strategies   listen to music (to help avoid negative thoughts)   play games online - connecting but not dealing with others   painting   journaling   seek out my dog (or a dog)   practice with makeup   anything creative/artistic            Step 3: People and social settings that provide distraction:   Name Contact Information   cousin Veronique    best friend Osito     Places   get in my car and \"go somewhere\"   [place]   go [place] a [place]           Step 4: People whom I can ask for help during a crisis:      Name Contact Information    shaq Veronique     best friend Osito       Step 5: Professionals or agencies I can contact during a crisis:      [facility]/Agency Name Phone Emergency Contact    [phone]      [facility]        Local Emergency Department Emergency Department Phone Emergency Department Address    [facility] in [place]          Crisis Phone Numbers:   Suicide Prevention Lifeline: Call or Text  988 Crisis Text Line: Text HOME to 530-212   Please note: Some [facility] do not have a separate number for Child/Adolescent specific crisis. If your county is not listed under Child/Adolescent, please call the adult number for your county      Adult Crisis Numbers: Child/Adolescent Crisis Numbers   [facility]: [phone] [facility]: [phone]   [facility]: [phone] [facility]: [phone]   [facility]: [phone] [place]: [phone]   [facility]: [phone] [place] [facility]: [phone]   [place] [facility]: [phone]   [facility]: [phone]   [facility]: " [phone]   [facility]: [phone] (daytime) [phone] (after hours, weekends, holidays)      Step 6: Making the environment safer (plan for lethal means safety):   Patient did not identify any lethal methods: Yes     Optional: What is most important to me and worth living for?   Doing the things I want to do in life     [facility]amela Safety Plan. Dinora Cole and Ilya Herring. Used with permission of the authors.

## 2025-05-15 NOTE — PSYCH
Behavioral Health Psychotherapy Progress Note    Psychotherapy Provided: Individual Psychotherapy     1. Generalized anxiety disorder        2. Social anxiety disorder        3. Mood disorder (HCC)            Goals addressed in session: Goal 1     DATA: With questioning, Juliana discussed experience at the  services for her grandmother. She noted getting through better than what she anticipated, and recognizing people were there for each other.  She stated still sorting through her own grieving process.  We discussed and completed safety plan.  Juliana denied recent suicidal thoughts.  We spent the session discussing her goals in life to be more independent, and having things she would like to experience.  She addressed the role of her creative interests such as Cosplay.  We explored her continued challenge in being herself.  She noted how she does not hurt anybody or herself in the things she does, but is still criticized for being different.  She acknowledged how her mother grew up placing importance on a conservative approach to life.  Juliana acknowledged how the more she is challenged on her individual choices, the more she exudes them (eg, when people challenge music choice, she will turn the sound up).  She acknowledged goals for relationships to improve and not really liking the aragon or challenge. We did acknowledge the sense of power however that comes from turning it back on others. We acknowledged Juliana's transition through independence and how it is a process, and not a finite or all-or-nothing experience.  We addressed a goal in next session to address strategies in dealing with the hurtful or judgmental words from others.  Provider encouraged Juliana to come up with a couple of examples for us to sort through.  We confirmed next appointment.       During this session, this clinician used the following therapeutic modalities: Engagement Strategies, Cognitive Behavioral Therapy, and Supportive  "Psychotherapy    Substance Abuse was not addressed during this session. If the client is diagnosed with a co-occurring substance use disorder, please indicate any changes in the frequency or amount of use: n/a. Stage of change for addressing substance use diagnoses: No substance use/Not applicable    ASSESSMENT:  Juliana Valdes presents with a Euthymic/ normal, Anxious, and somewhat annoyed (usual presentation) mood.     her affect is Normal range and intensity, which is congruent, with her mood and the content of the session. The client has made progress on their goals.    Juliana was active in the therapy session Juliana Valdes presents with a low risk of suicide, low risk of self-harm, and low risk of harm to others.  Denied SI.    For any risk assessment that surpasses a \"low\" rating, a safety plan must be developed.    A safety plan was indicated: no  If yes, describe in detail completed as per protocol versus due to being indicated    PLAN: Between sessions, Juliana Valdes will note examples of hurtful comments from others. At the next session, the therapist will use Cognitive Behavioral Therapy and Supportive Psychotherapy to address coping strategies, mood management.    Behavioral Health Treatment Plan and Discharge Planning: Juliana Valdes is aware of and agrees to continue to work on their treatment plan. They have identified and are working toward their discharge goals. yes    Depression Follow-up Plan Completed: Not applicable    Visit start and stop times:    05/15/25  Start Time: 1103  Stop Time: 1159  Total Visit Time: 56 minutes  "

## 2025-05-22 ENCOUNTER — SOCIAL WORK (OUTPATIENT)
Dept: BEHAVIORAL/MENTAL HEALTH CLINIC | Facility: CLINIC | Age: 20
End: 2025-05-22
Payer: COMMERCIAL

## 2025-05-22 DIAGNOSIS — F41.1 GENERALIZED ANXIETY DISORDER: ICD-10-CM

## 2025-05-22 DIAGNOSIS — F39 MOOD DISORDER (HCC): Primary | ICD-10-CM

## 2025-05-22 DIAGNOSIS — F40.10 SOCIAL ANXIETY DISORDER: ICD-10-CM

## 2025-05-22 PROCEDURE — 90837 PSYTX W PT 60 MINUTES: CPT

## 2025-05-22 NOTE — PSYCH
Behavioral Health Psychotherapy Progress Note    Psychotherapy Provided: Individual Psychotherapy     1. Mood disorder (HCC)        2. Generalized anxiety disorder        3. Social anxiety disorder            Goals addressed in session: Goal 1     DATA: Juliana addressed being annoyed at her sister being dumped by her boyfriend, and all the attention around this. She noted parents having her  chores, and them encouraging Juliana to talk to her sister. This led to further discussion of numerous questions from Juliana about relationships, the purpose, the benefits. We acknowledged her inquistiveness, and explored in connection to her feelings within her family. We addressed her questioning of her own sexuality and desire for any type of relationship. We addressed deeper issue of wanting to feel accepted for who she is, and the struggles in communication, especially with her parents. We validated her feelings, while also noting the apparent ineffective approach Juliana takes in trying to be heard. We addressed Juliana's goals for improvement in her relationship with her parents/family. We explored options and alternatives in how she communicates that she stated being willing to try. We addressed the feelings related to making efforts and desire for her parents to make changes as well.   We discussed scheduling and agreed to review scheduled sessions next time.     During this session, this clinician used the following therapeutic modalities: Engagement Strategies, Cognitive Behavioral Therapy, and Supportive Psychotherapy    Substance Abuse was not addressed during this session. If the client is diagnosed with a co-occurring substance use disorder, please indicate any changes in the frequency or amount of use: n/a. Stage of change for addressing substance use diagnoses: No substance use/Not applicable    ASSESSMENT:  Juliana Valdes presents with a Euthymic/ normal, Angry, and Anxious mood.     her affect is Normal range and intensity,  "which is congruent, with her mood and the content of the session. The client has made progress on their goals.    Juliana was able to 'be truthful' in her thoughts and feelings Juliana Valdes presents with a low risk of suicide, low risk of self-harm, and low risk of harm to others. No SI.    For any risk assessment that surpasses a \"low\" rating, a safety plan must be developed.    A safety plan was indicated: no  If yes, describe in detail n/a    PLAN: Between sessions, Juliana Valdes will continue to consider options in communications/goals for improvement in family relationships. At the next session, the therapist will use Cognitive Behavioral Therapy and Supportive Psychotherapy to address mood management and stability.    Behavioral Health Treatment Plan and Discharge Planning: Juliana Valdes is aware of and agrees to continue to work on their treatment plan. They have identified and are working toward their discharge goals. yes    Depression Follow-up Plan Completed: Not applicable    Visit start and stop times:    05/22/25  Start Time: 1104  Stop Time: 1159  Total Visit Time: 55 minutes  "
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
## 2025-05-29 ENCOUNTER — OFFICE VISIT (OUTPATIENT)
Dept: PSYCHIATRY | Facility: CLINIC | Age: 20
End: 2025-05-29
Payer: COMMERCIAL

## 2025-05-29 ENCOUNTER — SOCIAL WORK (OUTPATIENT)
Dept: BEHAVIORAL/MENTAL HEALTH CLINIC | Facility: CLINIC | Age: 20
End: 2025-05-29
Payer: COMMERCIAL

## 2025-05-29 VITALS — HEIGHT: 63 IN | WEIGHT: 205 LBS | BODY MASS INDEX: 36.32 KG/M2

## 2025-05-29 DIAGNOSIS — F39 MOOD DISORDER (HCC): ICD-10-CM

## 2025-05-29 DIAGNOSIS — F90.9 ADULT ADHD: ICD-10-CM

## 2025-05-29 DIAGNOSIS — F39 MOOD DISORDER (HCC): Primary | ICD-10-CM

## 2025-05-29 DIAGNOSIS — F41.1 GENERALIZED ANXIETY DISORDER: ICD-10-CM

## 2025-05-29 DIAGNOSIS — F40.10 SOCIAL ANXIETY DISORDER: ICD-10-CM

## 2025-05-29 DIAGNOSIS — G47.00 INSOMNIA, UNSPECIFIED TYPE: ICD-10-CM

## 2025-05-29 DIAGNOSIS — F41.1 GENERALIZED ANXIETY DISORDER: Primary | ICD-10-CM

## 2025-05-29 PROCEDURE — 90834 PSYTX W PT 45 MINUTES: CPT

## 2025-05-29 PROCEDURE — 99214 OFFICE O/P EST MOD 30 MIN: CPT | Performed by: PHYSICIAN ASSISTANT

## 2025-05-29 RX ORDER — METHYLPHENIDATE HYDROCHLORIDE 54 MG/1
54 TABLET ORAL DAILY
Qty: 30 TABLET | Refills: 0 | Status: SHIPPED | OUTPATIENT
Start: 2025-05-29

## 2025-05-29 RX ORDER — SERTRALINE HYDROCHLORIDE 100 MG/1
100 TABLET, FILM COATED ORAL DAILY
Qty: 90 TABLET | Refills: 0 | Status: SHIPPED | OUTPATIENT
Start: 2025-05-29

## 2025-05-29 RX ORDER — TRAZODONE HYDROCHLORIDE 50 MG/1
75-100 TABLET ORAL
Qty: 180 TABLET | Refills: 0 | Status: SHIPPED | OUTPATIENT
Start: 2025-05-29

## 2025-05-29 NOTE — BH TREATMENT PLAN
"TREATMENT PLAN (Medication Management Only)        [facility]    Name/Date of Birth/MRN:  Juliana Valdes 19 y.o. 2005 MRN: [id number]  Date of Treatment Plan: May 29, 2025  Diagnosis/Diagnoses:   1. Generalized anxiety disorder    2. Social anxiety disorder    3. Mood disorder (HCC)    4. Adult ADHD    5. Insomnia, unspecified type      Strengths/Personal Resources for Self-Care: \"Hard working; Intelligent; Compassionate\"  Area/Areas of need (in own words): \"Good\"  1. Long Term Goal:   Maintain mood stability and control of anxiety and ADHD  Target Date: 6 months - 11/29/2025  Person/Persons responsible for completion of goal: Celine, and Gracy Stearns LCSW (therapist)  2.  Short Term Objective (s) - How will we reach this goal?:   A.  Provider new recommended medication/dosage changes and/or continue medication(s): Pt is having the same mild anxiety and it is presently manageable.  No recent depressive Sxs or panic attacks, andADHD is under control with the increase in Methylphenidate ER last visit.  She appears stable and I will continue the present regimen. Treatment plan done and Pt accepts the plan. Safety Plan done by therapist on 5/15/2025.  Continue:   Trazodone 50mg (1 and 1/2 to 2) tabs po qhs prn insomnia # 180  Sertraline 100mg (1) tab po qd # 90  Methylphenidate ER 36mg (1) tab # 30 + 30 + 30 - Last filled 4/3/2025 per PDMP  Pt to continue psychotherapy with Gracy Stearns LCSW   Return 8-10 weeks.  Can call any time sooner prn.     Target Date: 6 months - 11/29/2025  Person/Persons Responsible for Completion of Goal: Celine, and Gracy   Progress Towards Goals: stable, continuing treatment  Treatment Modality: Medication mgt and psychotherapy  Review due 180 days from date of this plan: 6 months - 11/29/2025  Expected length of service: ongoing treatment unless revised  My Physician/PA/NP and I have developed this plan together and I agree to " work on the goals and objectives. I understand the treatment goals that were developed for my treatment.  Electronic Signatures: on file (unless signed below)    Danica Graham PA-C 05/29/25

## 2025-05-29 NOTE — PSYCH
Behavioral Health Psychotherapy Progress Note    Psychotherapy Provided: Individual Psychotherapy     1. Mood disorder (HCC)        2. Generalized anxiety disorder        3. Social anxiety disorder            Goals addressed in session: Goal 1     DATA: Juliana discussed her discontent at meeting a boy who presented himself as wanting to exchange anime books, but how he admitted to lying so that he could meet her. She voiced her anger at men and societally how they treat women. With questioning, discussed oppositional views about her father. She further voiced discontent at women succumbing to the mindset of having to have children and being a vessel for a 'parasite'. She addressed never wanting to have children, and noting her PCOS diagnosis. She addressed love for animals and wanting to own a safety place from kill shelter. She voiced other areas of anger toward people and society in terms of men and women roles, and Nondenominational and viewpoints. Provider attempted to sort through themes for Juliana, noting her sense of isolation and feeling different from everyone, her desire to be accepted and respected for who she is, and having to 'fight' for such acceptance. We addressed the seeming inner conflict of confidence in who she is, but still wanting and needing some validation and acceptance from others. With discussion, Juliana was able to point out a couple of examples of where she has felt validated and how differences in outlook and opinion were mutually respected. We addressed how she is carving out who she is, what she doesn't want in relationships and what she is looking for, and what feels positive. She addressed upcoming birthday, turning 20 and looking forward to having a drink and being on the beach. We reviewed and summarized today, Juliana confirmed direction of therapy is helpful. We noted interim until next appointment, and provider to keep her on a wait list for sooner appointment. Noted psychiatry f/up upcoming. Noted  "avenue for outreach if needed in the interim/phone number and card given.    During this session, this clinician used the following therapeutic modalities: Engagement Strategies, Cognitive Behavioral Therapy, and Supportive Psychotherapy    Substance Abuse was not addressed during this session. If the client is diagnosed with a co-occurring substance use disorder, please indicate any changes in the frequency or amount of use: n/a. Stage of change for addressing substance use diagnoses: No substance use/Not applicable    ASSESSMENT:  Juliana Valdes presents with a Euthymic/ normal and Angry mood.     her affect is Normal range and intensity, which is congruent, with her mood and the content of the session. The client has made progress on their goals.    Juliana was able to voice thoughts and feelings; she is aggressive verbally in her anger, but contained and appropriate;  Juliana Valdes presents with a low risk of suicide, low risk of self-harm, and low risk of harm to others. No SI; she is future-oriented and goal-directed.     For any risk assessment that surpasses a \"low\" rating, a safety plan must be developed.    A safety plan was indicated: no  If yes, describe in detail n/a    PLAN: Between sessions, Juliana Valdes will continue to address healthy activities; needs and communication in relationships. At the next session, the therapist will use Cognitive Behavioral Therapy and Supportive Psychotherapy to address mood stability.    Behavioral Health Treatment Plan and Discharge Planning: Juliana Valdes is aware of and agrees to continue to work on their treatment plan. They have identified and are working toward their discharge goals. yes    Depression Follow-up Plan Completed: Not applicable    Visit start and stop times:    05/29/25  Start Time: 1100  Stop Time: 1152  Total Visit Time: 52 minutes  "

## 2025-06-26 ENCOUNTER — TELEPHONE (OUTPATIENT)
Dept: BEHAVIORAL/MENTAL HEALTH CLINIC | Facility: CLINIC | Age: 20
End: 2025-06-26

## 2025-06-26 NOTE — TELEPHONE ENCOUNTER
Call to Juliana Valdes regarding appointment opening on Thursday, 7/10 at 9am, (Thursday is her stated best availability day). LM on  to return the call if interested in the appointment; gave main number; stated if I do not hear from her, I will assume she cannot make it or is not interested; reminded of next scheduled appointment/office visit on 7/30/25 at 3pm.

## 2025-07-10 ENCOUNTER — SOCIAL WORK (OUTPATIENT)
Dept: BEHAVIORAL/MENTAL HEALTH CLINIC | Facility: CLINIC | Age: 20
End: 2025-07-10
Payer: COMMERCIAL

## 2025-07-10 DIAGNOSIS — N94.6 DYSMENORRHEA: ICD-10-CM

## 2025-07-10 DIAGNOSIS — F39 MOOD DISORDER (HCC): Primary | ICD-10-CM

## 2025-07-10 DIAGNOSIS — F40.10 SOCIAL ANXIETY DISORDER: ICD-10-CM

## 2025-07-10 DIAGNOSIS — F41.1 GENERALIZED ANXIETY DISORDER: ICD-10-CM

## 2025-07-10 PROCEDURE — 90837 PSYTX W PT 60 MINUTES: CPT

## 2025-07-10 RX ORDER — NORGESTIMATE AND ETHINYL ESTRADIOL 7DAYSX3 28
1 KIT ORAL DAILY
Qty: 28 TABLET | Refills: 0 | Status: SHIPPED | OUTPATIENT
Start: 2025-07-10
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
## 2025-07-23 ENCOUNTER — TELEPHONE (OUTPATIENT)
Age: 20
End: 2025-07-23

## 2025-07-23 ENCOUNTER — TELEPHONE (OUTPATIENT)
Dept: BEHAVIORAL/MENTAL HEALTH CLINIC | Facility: CLINIC | Age: 20
End: 2025-07-23

## 2025-07-23 NOTE — TELEPHONE ENCOUNTER
LM on provided number [phone], of appointment opening 7/29 at 4pm, as an alternative to 7/30 appointment that provider needed to cancel; LM that I booked her in, and to please call main number if can't make this time. Provider will then look for another alternative.

## 2025-07-23 NOTE — TELEPHONE ENCOUNTER
Patient is calling regarding cancelling an appointment.    Date/Time: 7.29.25 @ 5:00PM    Reason: None given    Patient was rescheduled: YES [] NO [x]  Patient declined rescheduling at this time.

## 2025-07-24 ENCOUNTER — OFFICE VISIT (OUTPATIENT)
Dept: PSYCHIATRY | Facility: CLINIC | Age: 20
End: 2025-07-24
Payer: COMMERCIAL

## 2025-07-24 VITALS — WEIGHT: 209.2 LBS | HEIGHT: 63 IN | BODY MASS INDEX: 37.07 KG/M2

## 2025-07-24 DIAGNOSIS — G47.00 INSOMNIA, UNSPECIFIED TYPE: ICD-10-CM

## 2025-07-24 DIAGNOSIS — F40.10 SOCIAL ANXIETY DISORDER: ICD-10-CM

## 2025-07-24 DIAGNOSIS — F90.9 ADULT ADHD: ICD-10-CM

## 2025-07-24 DIAGNOSIS — F41.1 GENERALIZED ANXIETY DISORDER: Primary | ICD-10-CM

## 2025-07-24 DIAGNOSIS — F39 MOOD DISORDER (HCC): ICD-10-CM

## 2025-07-24 PROCEDURE — 99214 OFFICE O/P EST MOD 30 MIN: CPT | Performed by: PHYSICIAN ASSISTANT

## 2025-07-24 RX ORDER — METHYLPHENIDATE HYDROCHLORIDE 54 MG/1
54 TABLET ORAL DAILY
Qty: 30 TABLET | Refills: 0 | Status: SHIPPED | OUTPATIENT
Start: 2025-07-24

## 2025-07-24 RX ORDER — SERTRALINE HYDROCHLORIDE 100 MG/1
100 TABLET, FILM COATED ORAL DAILY
Qty: 90 TABLET | Refills: 0 | Status: SHIPPED | OUTPATIENT
Start: 2025-07-24
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
## 2025-07-24 NOTE — ASSESSMENT & PLAN NOTE
Stable  Orders:    sertraline (ZOLOFT) 100 mg tablet; Take 1 tablet (100 mg total) by mouth daily

## 2025-07-30 ENCOUNTER — ANNUAL EXAM (OUTPATIENT)
Dept: OBGYN CLINIC | Facility: MEDICAL CENTER | Age: 20
End: 2025-07-30
Payer: COMMERCIAL

## 2025-07-30 VITALS — WEIGHT: 210.6 LBS | BODY MASS INDEX: 37.91 KG/M2 | DIASTOLIC BLOOD PRESSURE: 68 MMHG | SYSTOLIC BLOOD PRESSURE: 124 MMHG

## 2025-07-30 DIAGNOSIS — Z01.419 ENCOUNTER FOR GYNECOLOGICAL EXAMINATION WITHOUT ABNORMAL FINDING: Primary | ICD-10-CM

## 2025-07-30 DIAGNOSIS — Z12.4 ENCOUNTER FOR PAPANICOLAOU SMEAR FOR CERVICAL CANCER SCREENING: ICD-10-CM

## 2025-07-30 DIAGNOSIS — Z11.3 SCREENING FOR STD (SEXUALLY TRANSMITTED DISEASE): ICD-10-CM

## 2025-07-30 DIAGNOSIS — N94.6 DYSMENORRHEA: ICD-10-CM

## 2025-07-30 PROCEDURE — G0145 SCR C/V CYTO,THINLAYER,RESCR: HCPCS | Performed by: NURSE PRACTITIONER

## 2025-07-30 PROCEDURE — 99395 PREV VISIT EST AGE 18-39: CPT | Performed by: NURSE PRACTITIONER

## 2025-07-30 PROCEDURE — 87591 N.GONORRHOEAE DNA AMP PROB: CPT | Performed by: NURSE PRACTITIONER

## 2025-07-30 PROCEDURE — 87491 CHLMYD TRACH DNA AMP PROBE: CPT | Performed by: NURSE PRACTITIONER

## 2025-07-30 RX ORDER — NORGESTIMATE AND ETHINYL ESTRADIOL 7DAYSX3 28
1 KIT ORAL DAILY
Qty: 28 TABLET | Refills: 11 | Status: SHIPPED | OUTPATIENT
Start: 2025-07-30

## 2025-08-01 LAB
C TRACH DNA SPEC QL NAA+PROBE: NEGATIVE
N GONORRHOEA DNA SPEC QL NAA+PROBE: NEGATIVE

## 2025-08-04 LAB
LAB AP GYN PRIMARY INTERPRETATION: NORMAL
Lab: NORMAL

## 2025-08-21 DIAGNOSIS — G47.00 INSOMNIA, UNSPECIFIED TYPE: ICD-10-CM

## 2025-08-21 RX ORDER — TRAZODONE HYDROCHLORIDE 50 MG/1
TABLET ORAL
Qty: 180 TABLET | Refills: 0 | Status: SHIPPED | OUTPATIENT
Start: 2025-08-21